# Patient Record
Sex: FEMALE | Race: WHITE | NOT HISPANIC OR LATINO
[De-identification: names, ages, dates, MRNs, and addresses within clinical notes are randomized per-mention and may not be internally consistent; named-entity substitution may affect disease eponyms.]

---

## 2017-01-09 ENCOUNTER — LABORATORY RESULT (OUTPATIENT)
Age: 72
End: 2017-01-09

## 2017-01-09 ENCOUNTER — APPOINTMENT (OUTPATIENT)
Dept: INTERNAL MEDICINE | Facility: CLINIC | Age: 72
End: 2017-01-09

## 2017-01-09 VITALS
WEIGHT: 151 LBS | TEMPERATURE: 97.7 F | HEIGHT: 61 IN | DIASTOLIC BLOOD PRESSURE: 74 MMHG | SYSTOLIC BLOOD PRESSURE: 124 MMHG | BODY MASS INDEX: 28.51 KG/M2 | HEART RATE: 80 BPM | OXYGEN SATURATION: 97 %

## 2017-01-09 LAB
BILIRUB UR QL STRIP: NORMAL
CLARITY UR: CLEAR
COLLECTION METHOD: NORMAL
GLUCOSE UR-MCNC: NORMAL
HCG UR QL: 0.2 EU/DL
HGB UR QL STRIP.AUTO: NORMAL
KETONES UR-MCNC: NORMAL
LEUKOCYTE ESTERASE UR QL STRIP: NORMAL
NITRITE UR QL STRIP: NORMAL
PH UR STRIP: 5.5
PROT UR STRIP-MCNC: NORMAL
SP GR UR STRIP: 1.02

## 2017-01-10 LAB
25(OH)D3 SERPL-MCNC: 36.8 NG/ML
ALBUMIN SERPL ELPH-MCNC: 4 G/DL
ALP BLD-CCNC: 34 U/L
ALT SERPL-CCNC: 12 U/L
ANION GAP SERPL CALC-SCNC: 12 MMOL/L
AST SERPL-CCNC: 20 U/L
BASOPHILS # BLD AUTO: 0.02 K/UL
BASOPHILS NFR BLD AUTO: 0.3 %
BILIRUB SERPL-MCNC: 0.4 MG/DL
BUN SERPL-MCNC: 21 MG/DL
CALCIUM SERPL-MCNC: 9.3 MG/DL
CHLORIDE SERPL-SCNC: 105 MMOL/L
CHOLEST SERPL-MCNC: 208 MG/DL
CHOLEST/HDLC SERPL: 2.7 RATIO
CO2 SERPL-SCNC: 25 MMOL/L
CREAT SERPL-MCNC: 0.81 MG/DL
EOSINOPHIL # BLD AUTO: 0.15 K/UL
EOSINOPHIL NFR BLD AUTO: 2.4 %
GLUCOSE SERPL-MCNC: 93 MG/DL
HBA1C MFR BLD HPLC: 5.8 %
HCT VFR BLD CALC: 43.4 %
HDLC SERPL-MCNC: 78 MG/DL
HGB BLD-MCNC: 13.8 G/DL
IMM GRANULOCYTES NFR BLD AUTO: 0.2 %
LDLC SERPL CALC-MCNC: 119 MG/DL
LYMPHOCYTES # BLD AUTO: 2.67 K/UL
LYMPHOCYTES NFR BLD AUTO: 42.2 %
MAN DIFF?: NORMAL
MCHC RBC-ENTMCNC: 28.5 PG
MCHC RBC-ENTMCNC: 31.8 GM/DL
MCV RBC AUTO: 89.5 FL
MONOCYTES # BLD AUTO: 0.34 K/UL
MONOCYTES NFR BLD AUTO: 5.4 %
NEUTROPHILS # BLD AUTO: 3.14 K/UL
NEUTROPHILS NFR BLD AUTO: 49.5 %
PLATELET # BLD AUTO: 276 K/UL
POTASSIUM SERPL-SCNC: 4.5 MMOL/L
PROT SERPL-MCNC: 6.4 G/DL
RBC # BLD: 4.85 M/UL
RBC # FLD: 14.8 %
SODIUM SERPL-SCNC: 142 MMOL/L
T3RU NFR SERPL: 1.02 INDEX
T4 SERPL-MCNC: 5.8 UG/DL
TRIGL SERPL-MCNC: 57 MG/DL
TSH SERPL-ACNC: 1.64 UU/ML
WBC # FLD AUTO: 6.33 K/UL

## 2017-04-27 ENCOUNTER — OUTPATIENT (OUTPATIENT)
Dept: OUTPATIENT SERVICES | Facility: HOSPITAL | Age: 72
LOS: 1 days | End: 2017-04-27

## 2017-04-27 ENCOUNTER — OUTPATIENT (OUTPATIENT)
Dept: OUTPATIENT SERVICES | Facility: HOSPITAL | Age: 72
LOS: 1 days | End: 2017-04-27
Payer: COMMERCIAL

## 2017-04-27 ENCOUNTER — APPOINTMENT (OUTPATIENT)
Dept: RADIOLOGY | Facility: CLINIC | Age: 72
End: 2017-04-27

## 2017-04-27 DIAGNOSIS — M17.9 OSTEOARTHRITIS OF KNEE, UNSPECIFIED: ICD-10-CM

## 2017-04-27 DIAGNOSIS — M25.562 PAIN IN LEFT KNEE: ICD-10-CM

## 2017-04-27 DIAGNOSIS — M25.561 PAIN IN RIGHT KNEE: ICD-10-CM

## 2017-04-27 PROCEDURE — 73562 X-RAY EXAM OF KNEE 3: CPT

## 2017-05-08 ENCOUNTER — APPOINTMENT (OUTPATIENT)
Dept: INTERNAL MEDICINE | Facility: CLINIC | Age: 72
End: 2017-05-08

## 2017-05-08 ENCOUNTER — NON-APPOINTMENT (OUTPATIENT)
Age: 72
End: 2017-05-08

## 2017-05-08 VITALS
SYSTOLIC BLOOD PRESSURE: 120 MMHG | WEIGHT: 159 LBS | TEMPERATURE: 98.1 F | OXYGEN SATURATION: 93 % | HEART RATE: 76 BPM | HEIGHT: 61 IN | BODY MASS INDEX: 30.02 KG/M2 | DIASTOLIC BLOOD PRESSURE: 76 MMHG

## 2017-05-08 DIAGNOSIS — Z78.9 OTHER SPECIFIED HEALTH STATUS: ICD-10-CM

## 2017-05-08 DIAGNOSIS — Z01.818 ENCOUNTER FOR OTHER PREPROCEDURAL EXAMINATION: ICD-10-CM

## 2017-05-08 DIAGNOSIS — R19.4 CHANGE IN BOWEL HABIT: ICD-10-CM

## 2017-05-08 DIAGNOSIS — M72.0 PALMAR FASCIAL FIBROMATOSIS [DUPUYTREN]: ICD-10-CM

## 2017-05-08 DIAGNOSIS — R35.0 FREQUENCY OF MICTURITION: ICD-10-CM

## 2017-05-08 DIAGNOSIS — Z87.898 PERSONAL HISTORY OF OTHER SPECIFIED CONDITIONS: ICD-10-CM

## 2017-05-09 LAB
APPEARANCE: CLEAR
BACTERIA: ABNORMAL
BILIRUBIN URINE: NEGATIVE
BLOOD URINE: ABNORMAL
COLOR: YELLOW
GLUCOSE QUALITATIVE U: NORMAL MG/DL
HYALINE CASTS: 6 /LPF
KETONES URINE: NEGATIVE
LEUKOCYTE ESTERASE URINE: NEGATIVE
MICROSCOPIC-UA: NORMAL
NITRITE URINE: NEGATIVE
PH URINE: 6
PROTEIN URINE: NEGATIVE MG/DL
RED BLOOD CELLS URINE: 2 /HPF
SPECIFIC GRAVITY URINE: 1.01
SQUAMOUS EPITHELIAL CELLS: 2 /HPF
UROBILINOGEN URINE: NORMAL MG/DL
WHITE BLOOD CELLS URINE: 0 /HPF

## 2017-05-11 LAB — BACTERIA UR CULT: ABNORMAL

## 2017-10-12 ENCOUNTER — APPOINTMENT (OUTPATIENT)
Dept: MRI IMAGING | Facility: CLINIC | Age: 72
End: 2017-10-12
Payer: COMMERCIAL

## 2017-10-12 ENCOUNTER — OUTPATIENT (OUTPATIENT)
Dept: OUTPATIENT SERVICES | Facility: HOSPITAL | Age: 72
LOS: 1 days | End: 2017-10-12
Payer: COMMERCIAL

## 2017-10-12 DIAGNOSIS — Z00.8 ENCOUNTER FOR OTHER GENERAL EXAMINATION: ICD-10-CM

## 2017-10-12 PROCEDURE — 73721 MRI JNT OF LWR EXTRE W/O DYE: CPT | Mod: 26,RT

## 2017-10-12 PROCEDURE — 73721 MRI JNT OF LWR EXTRE W/O DYE: CPT

## 2017-12-13 ENCOUNTER — APPOINTMENT (OUTPATIENT)
Dept: ORTHOPEDIC SURGERY | Facility: CLINIC | Age: 72
End: 2017-12-13
Payer: COMMERCIAL

## 2017-12-13 PROCEDURE — 99203 OFFICE O/P NEW LOW 30 MIN: CPT

## 2018-01-21 ENCOUNTER — LABORATORY RESULT (OUTPATIENT)
Age: 73
End: 2018-01-21

## 2018-01-22 ENCOUNTER — APPOINTMENT (OUTPATIENT)
Dept: INTERNAL MEDICINE | Facility: CLINIC | Age: 73
End: 2018-01-22
Payer: COMMERCIAL

## 2018-01-22 ENCOUNTER — NON-APPOINTMENT (OUTPATIENT)
Age: 73
End: 2018-01-22

## 2018-01-22 VITALS
BODY MASS INDEX: 30.21 KG/M2 | DIASTOLIC BLOOD PRESSURE: 70 MMHG | SYSTOLIC BLOOD PRESSURE: 110 MMHG | OXYGEN SATURATION: 98 % | HEART RATE: 77 BPM | HEIGHT: 61 IN | TEMPERATURE: 97.8 F | WEIGHT: 160 LBS

## 2018-01-22 DIAGNOSIS — M54.16 RADICULOPATHY, LUMBAR REGION: ICD-10-CM

## 2018-01-22 DIAGNOSIS — Z00.00 ENCOUNTER FOR GENERAL ADULT MEDICAL EXAMINATION W/OUT ABNORMAL FINDINGS: ICD-10-CM

## 2018-01-22 DIAGNOSIS — H35.30 UNSPECIFIED MACULAR DEGENERATION: ICD-10-CM

## 2018-01-22 DIAGNOSIS — E55.9 VITAMIN D DEFICIENCY, UNSPECIFIED: ICD-10-CM

## 2018-01-22 PROCEDURE — 36415 COLL VENOUS BLD VENIPUNCTURE: CPT

## 2018-01-22 PROCEDURE — 93000 ELECTROCARDIOGRAM COMPLETE: CPT

## 2018-01-22 PROCEDURE — 99397 PER PM REEVAL EST PAT 65+ YR: CPT | Mod: 25

## 2018-01-23 LAB
25(OH)D3 SERPL-MCNC: 22.7 NG/ML
ALBUMIN SERPL ELPH-MCNC: 3.9 G/DL
ALP BLD-CCNC: 39 U/L
ALT SERPL-CCNC: 7 U/L
ANION GAP SERPL CALC-SCNC: 15 MMOL/L
AST SERPL-CCNC: 16 U/L
BASOPHILS # BLD AUTO: 0.02 K/UL
BASOPHILS NFR BLD AUTO: 0.3 %
BILIRUB SERPL-MCNC: 0.5 MG/DL
BUN SERPL-MCNC: 27 MG/DL
CALCIUM SERPL-MCNC: 9.2 MG/DL
CHLORIDE SERPL-SCNC: 105 MMOL/L
CHOLEST SERPL-MCNC: 201 MG/DL
CHOLEST/HDLC SERPL: 2.7 RATIO
CO2 SERPL-SCNC: 23 MMOL/L
CREAT SERPL-MCNC: 0.85 MG/DL
EOSINOPHIL # BLD AUTO: 0.15 K/UL
EOSINOPHIL NFR BLD AUTO: 2.4 %
GLUCOSE SERPL-MCNC: 94 MG/DL
HBA1C MFR BLD HPLC: 5.6 %
HCT VFR BLD CALC: 43.2 %
HDLC SERPL-MCNC: 75 MG/DL
HGB BLD-MCNC: 13.4 G/DL
IMM GRANULOCYTES NFR BLD AUTO: 0.2 %
LDLC SERPL CALC-MCNC: 115 MG/DL
LYMPHOCYTES # BLD AUTO: 1.86 K/UL
LYMPHOCYTES NFR BLD AUTO: 30.3 %
MAN DIFF?: NORMAL
MCHC RBC-ENTMCNC: 28.6 PG
MCHC RBC-ENTMCNC: 31 GM/DL
MCV RBC AUTO: 92.3 FL
MONOCYTES # BLD AUTO: 0.45 K/UL
MONOCYTES NFR BLD AUTO: 7.3 %
NEUTROPHILS # BLD AUTO: 3.64 K/UL
NEUTROPHILS NFR BLD AUTO: 59.5 %
PLATELET # BLD AUTO: 224 K/UL
POTASSIUM SERPL-SCNC: 4.1 MMOL/L
PROT SERPL-MCNC: 6.3 G/DL
RBC # BLD: 4.68 M/UL
RBC # FLD: 14.8 %
SODIUM SERPL-SCNC: 143 MMOL/L
T3RU NFR SERPL: 0.93 INDEX
T4 SERPL-MCNC: 6 UG/DL
TRIGL SERPL-MCNC: 54 MG/DL
TSH SERPL-ACNC: 1.26 UIU/ML
WBC # FLD AUTO: 6.13 K/UL

## 2018-06-27 ENCOUNTER — RX RENEWAL (OUTPATIENT)
Age: 73
End: 2018-06-27

## 2018-08-19 ENCOUNTER — RX RENEWAL (OUTPATIENT)
Age: 73
End: 2018-08-19

## 2018-10-10 ENCOUNTER — APPOINTMENT (OUTPATIENT)
Dept: PULMONOLOGY | Facility: CLINIC | Age: 73
End: 2018-10-10
Payer: COMMERCIAL

## 2018-10-10 VITALS
RESPIRATION RATE: 12 BRPM | HEART RATE: 66 BPM | DIASTOLIC BLOOD PRESSURE: 75 MMHG | OXYGEN SATURATION: 94 % | TEMPERATURE: 97.8 F | SYSTOLIC BLOOD PRESSURE: 131 MMHG

## 2018-10-10 PROCEDURE — 94060 EVALUATION OF WHEEZING: CPT

## 2018-10-10 PROCEDURE — 99213 OFFICE O/P EST LOW 20 MIN: CPT | Mod: 25

## 2018-10-10 PROCEDURE — G0008: CPT

## 2018-10-10 PROCEDURE — 90662 IIV NO PRSV INCREASED AG IM: CPT

## 2018-10-31 ENCOUNTER — APPOINTMENT (OUTPATIENT)
Dept: PULMONOLOGY | Facility: CLINIC | Age: 73
End: 2018-10-31
Payer: COMMERCIAL

## 2018-10-31 VITALS — SYSTOLIC BLOOD PRESSURE: 126 MMHG | DIASTOLIC BLOOD PRESSURE: 80 MMHG

## 2018-10-31 VITALS
TEMPERATURE: 97.9 F | WEIGHT: 160 LBS | HEIGHT: 61 IN | DIASTOLIC BLOOD PRESSURE: 85 MMHG | OXYGEN SATURATION: 93 % | BODY MASS INDEX: 30.21 KG/M2 | SYSTOLIC BLOOD PRESSURE: 150 MMHG | RESPIRATION RATE: 14 BRPM | HEART RATE: 61 BPM

## 2018-10-31 PROCEDURE — 99213 OFFICE O/P EST LOW 20 MIN: CPT

## 2018-11-12 ENCOUNTER — MEDICATION RENEWAL (OUTPATIENT)
Age: 73
End: 2018-11-12

## 2018-11-13 ENCOUNTER — RX RENEWAL (OUTPATIENT)
Age: 73
End: 2018-11-13

## 2018-11-19 ENCOUNTER — MEDICATION RENEWAL (OUTPATIENT)
Age: 73
End: 2018-11-19

## 2019-01-02 ENCOUNTER — MEDICATION RENEWAL (OUTPATIENT)
Age: 74
End: 2019-01-02

## 2019-01-22 ENCOUNTER — RX RENEWAL (OUTPATIENT)
Age: 74
End: 2019-01-22

## 2019-02-05 ENCOUNTER — APPOINTMENT (OUTPATIENT)
Dept: PULMONOLOGY | Facility: CLINIC | Age: 74
End: 2019-02-05
Payer: COMMERCIAL

## 2019-02-05 VITALS
HEART RATE: 79 BPM | OXYGEN SATURATION: 95 % | RESPIRATION RATE: 16 BRPM | DIASTOLIC BLOOD PRESSURE: 69 MMHG | SYSTOLIC BLOOD PRESSURE: 113 MMHG

## 2019-02-05 DIAGNOSIS — J98.59 OTHER DISEASES OF MEDIASTINUM, NOT ELSEWHERE CLASSIFIED: ICD-10-CM

## 2019-02-05 PROCEDURE — 99214 OFFICE O/P EST MOD 30 MIN: CPT

## 2019-02-05 NOTE — PHYSICAL EXAM
[General Appearance - Well Developed] : well developed [General Appearance - Well Nourished] : well nourished [Normal Oropharynx] : normal oropharynx [Jugular Venous Distention Increased] : there was no jugular-venous distention [Heart Sounds] : normal S1 and S2 [Murmurs] : no murmurs present [Respiration, Rhythm And Depth] : normal respiratory rhythm and effort [Exaggerated Use Of Accessory Muscles For Inspiration] : no accessory muscle use [Auscultation Breath Sounds / Voice Sounds] : lungs were clear to auscultation bilaterally [Abdomen Soft] : soft [Abdomen Tenderness] : non-tender [Abdomen Mass (___ Cm)] : no abdominal mass palpated [Nail Clubbing] : no clubbing of the fingernails [Cyanosis, Localized] : no localized cyanosis [Petechial Hemorrhages (___cm)] : no petechial hemorrhages [] : no ischemic changes

## 2019-02-07 NOTE — HISTORY OF PRESENT ILLNESS
[FreeTextEntry1] : Took arnuity for 3 months with positive response. Then ran out and cough recc. despite proair

## 2019-02-07 NOTE — ASSESSMENT
[FreeTextEntry1] : Cough improved with inhaled steroid recurred off. ? cough variant asthma\par Restart inhaled steroid\par \par Abnormal ct likely inf/inflamm\par Thymic abn no change. Arrange pre and post contrast ct in 6 months

## 2019-03-08 ENCOUNTER — MEDICATION RENEWAL (OUTPATIENT)
Age: 74
End: 2019-03-08

## 2019-03-08 ENCOUNTER — TRANSCRIPTION ENCOUNTER (OUTPATIENT)
Age: 74
End: 2019-03-08

## 2019-03-12 ENCOUNTER — RX RENEWAL (OUTPATIENT)
Age: 74
End: 2019-03-12

## 2019-04-09 ENCOUNTER — APPOINTMENT (OUTPATIENT)
Dept: PULMONOLOGY | Facility: CLINIC | Age: 74
End: 2019-04-09
Payer: COMMERCIAL

## 2019-04-09 VITALS
TEMPERATURE: 97.6 F | DIASTOLIC BLOOD PRESSURE: 76 MMHG | SYSTOLIC BLOOD PRESSURE: 124 MMHG | RESPIRATION RATE: 12 BRPM | HEART RATE: 69 BPM | OXYGEN SATURATION: 96 %

## 2019-04-09 PROCEDURE — 99213 OFFICE O/P EST LOW 20 MIN: CPT

## 2019-04-09 NOTE — PHYSICAL EXAM
[General Appearance - Well Developed] : well developed [Normal Oropharynx] : normal oropharynx [General Appearance - Well Nourished] : well nourished [Jugular Venous Distention Increased] : there was no jugular-venous distention [Heart Sounds] : normal S1 and S2 [Murmurs] : no murmurs present [Respiration, Rhythm And Depth] : normal respiratory rhythm and effort [Auscultation Breath Sounds / Voice Sounds] : lungs were clear to auscultation bilaterally [Exaggerated Use Of Accessory Muscles For Inspiration] : no accessory muscle use [Abdomen Soft] : soft [Abdomen Tenderness] : non-tender [Abdomen Mass (___ Cm)] : no abdominal mass palpated [Cyanosis, Localized] : no localized cyanosis [Petechial Hemorrhages (___cm)] : no petechial hemorrhages [Nail Clubbing] : no clubbing of the fingernails [] : no ischemic changes

## 2019-04-11 NOTE — HISTORY OF PRESENT ILLNESS
[FreeTextEntry1] : Cough is much improved Asmanex 2 puffs daily. On Xyzal for allergies. Minimal mucus

## 2019-04-11 NOTE — ASSESSMENT
[FreeTextEntry1] : Patient clinically improved.\par \par \par Medications reviewed and renewed.\par

## 2019-05-09 ENCOUNTER — APPOINTMENT (OUTPATIENT)
Dept: PULMONOLOGY | Facility: CLINIC | Age: 74
End: 2019-05-09
Payer: COMMERCIAL

## 2019-05-09 VITALS
RESPIRATION RATE: 12 BRPM | SYSTOLIC BLOOD PRESSURE: 127 MMHG | OXYGEN SATURATION: 97 % | DIASTOLIC BLOOD PRESSURE: 77 MMHG | HEART RATE: 79 BPM | TEMPERATURE: 98.3 F

## 2019-05-09 DIAGNOSIS — J06.9 ACUTE UPPER RESPIRATORY INFECTION, UNSPECIFIED: ICD-10-CM

## 2019-05-09 PROCEDURE — 87804 INFLUENZA ASSAY W/OPTIC: CPT | Mod: QW

## 2019-05-09 PROCEDURE — 87880 STREP A ASSAY W/OPTIC: CPT | Mod: QW

## 2019-05-09 PROCEDURE — 99214 OFFICE O/P EST MOD 30 MIN: CPT | Mod: 25

## 2019-05-09 PROCEDURE — 71046 X-RAY EXAM CHEST 2 VIEWS: CPT

## 2019-05-09 RX ORDER — MONTELUKAST 10 MG/1
10 TABLET, FILM COATED ORAL
Qty: 30 | Refills: 5 | Status: DISCONTINUED | COMMUNITY
Start: 2018-10-10 | End: 2019-05-09

## 2019-05-09 NOTE — REVIEW OF SYSTEMS
[Postnasal Drip] : postnasal drip [Nasal Congestion] : nasal congestion [Sore Throat] : sore throat [Cough] : cough [Negative] : Sleep Disorder

## 2019-05-10 LAB
HPIV3 RNA SPEC QL NAA+PROBE: DETECTED
RAPID RVP RESULT: DETECTED

## 2019-05-10 NOTE — REASON FOR VISIT
[Follow-Up] : a follow-up visit [Cough] : cough [Shortness of Breath] : shortness of Breath [FreeTextEntry2] : Sick with cough for the last 3 days, also has nasal congestion and sore throat

## 2019-05-10 NOTE — PHYSICAL EXAM
[Normal Appearance] : normal appearance [General Appearance - Well Developed] : well developed [Well Groomed] : well groomed [General Appearance - In No Acute Distress] : no acute distress [No Deformities] : no deformities [General Appearance - Well Nourished] : well nourished [Erythema] : erythema of the pharynx [Heart Sounds] : normal S1 and S2 [Heart Rate And Rhythm] : heart rate and rhythm were normal [Murmurs] : no murmurs present [Respiration, Rhythm And Depth] : normal respiratory rhythm and effort [Auscultation Breath Sounds / Voice Sounds] : lungs were clear to auscultation bilaterally [Exaggerated Use Of Accessory Muscles For Inspiration] : no accessory muscle use [Abdomen Soft] : soft [Abdomen Tenderness] : non-tender [Cyanosis, Localized] : no localized cyanosis [Abdomen Mass (___ Cm)] : no abdominal mass palpated [Nail Clubbing] : no clubbing of the fingernails [Petechial Hemorrhages (___cm)] : no petechial hemorrhages [] : no ischemic changes

## 2019-05-10 NOTE — PROCEDURE
[FreeTextEntry1] : Rapid flu and strep tests are negative\par \par Chest x-ray PA and lateral views performed in my office today showed clear lungs, no evidence of infiltrates or pleural effusions.\par

## 2019-05-10 NOTE — ASSESSMENT
[FreeTextEntry1] : Collected nasopharyngeal RVP for further evaluation. Starting Peggy on Z-Carlton and Medrol Dosepak. Also advised her on well hydration

## 2019-05-10 NOTE — HISTORY OF PRESENT ILLNESS
[FreeTextEntry1] : Started feeling itchy throat over the weekend that progressed and worsened yesterday. Cough, nasal congestion, runny nose, head cold and earache. Has been taking Asmanex to help sleep through the night. Used proair inhaler. No complaints of SOB, or dyspnea.

## 2019-05-12 LAB
FLUAV SPEC QL CULT: NORMAL
FLUBV AG SPEC QL IA: NORMAL
S PYO AG SPEC QL IA: NORMAL

## 2019-07-02 DIAGNOSIS — E32.0 PERSISTENT HYPERPLASIA OF THYMUS: ICD-10-CM

## 2019-07-19 ENCOUNTER — TRANSCRIPTION ENCOUNTER (OUTPATIENT)
Age: 74
End: 2019-07-19

## 2019-09-03 ENCOUNTER — TRANSCRIPTION ENCOUNTER (OUTPATIENT)
Age: 74
End: 2019-09-03

## 2019-11-04 ENCOUNTER — FORM ENCOUNTER (OUTPATIENT)
Age: 74
End: 2019-11-04

## 2019-11-05 ENCOUNTER — APPOINTMENT (OUTPATIENT)
Dept: PULMONOLOGY | Facility: CLINIC | Age: 74
End: 2019-11-05
Payer: MEDICARE

## 2019-11-05 VITALS
OXYGEN SATURATION: 96 % | SYSTOLIC BLOOD PRESSURE: 108 MMHG | BODY MASS INDEX: 30.21 KG/M2 | HEART RATE: 71 BPM | RESPIRATION RATE: 12 BRPM | DIASTOLIC BLOOD PRESSURE: 74 MMHG | WEIGHT: 160 LBS | HEIGHT: 61 IN | TEMPERATURE: 97.4 F

## 2019-11-05 PROCEDURE — 94729 DIFFUSING CAPACITY: CPT

## 2019-11-05 PROCEDURE — 94727 GAS DIL/WSHOT DETER LNG VOL: CPT

## 2019-11-05 PROCEDURE — 99213 OFFICE O/P EST LOW 20 MIN: CPT | Mod: 25

## 2019-11-05 PROCEDURE — 94060 EVALUATION OF WHEEZING: CPT

## 2019-11-05 RX ORDER — METHYLPREDNISOLONE 4 MG/1
4 TABLET ORAL
Qty: 1 | Refills: 0 | Status: DISCONTINUED | COMMUNITY
Start: 2019-05-09 | End: 2019-11-05

## 2019-11-05 RX ORDER — AZITHROMYCIN 250 MG/1
250 TABLET, FILM COATED ORAL
Qty: 6 | Refills: 1 | Status: DISCONTINUED | COMMUNITY
Start: 2019-05-09 | End: 2019-11-05

## 2019-11-05 RX ORDER — MOMETASONE FUROATE 220 UG/1
220 INHALANT RESPIRATORY (INHALATION) DAILY
Qty: 1 | Refills: 5 | Status: DISCONTINUED | COMMUNITY
Start: 2019-02-05 | End: 2019-11-05

## 2019-11-05 NOTE — DISCUSSION/SUMMARY
[FreeTextEntry1] : Bronchiectasis stable\par Cough improved on inhaled steroid. ? cough variant asthma.

## 2019-11-05 NOTE — HISTORY OF PRESENT ILLNESS
[FreeTextEntry1] : Doing well except when allergies flare. Then some cough.\par \par Had Flu and pneumonia shots.

## 2019-11-05 NOTE — PROCEDURE
[FreeTextEntry1] : 11/05/2019\par Pulmonary function testing\par FEV1, FVC, and FEV1/FVC are within normal limits. There was not a significant response to inhaled bronchodilator. TLC and subdivisions are normal. RV/TLC ratio is normal. Single breath diffusion capacity is normal.

## 2019-12-02 ENCOUNTER — RX RENEWAL (OUTPATIENT)
Age: 74
End: 2019-12-02

## 2020-02-03 ENCOUNTER — RX RENEWAL (OUTPATIENT)
Age: 75
End: 2020-02-03

## 2020-04-24 ENCOUNTER — RX RENEWAL (OUTPATIENT)
Age: 75
End: 2020-04-24

## 2020-04-26 ENCOUNTER — TRANSCRIPTION ENCOUNTER (OUTPATIENT)
Age: 75
End: 2020-04-26

## 2020-05-10 ENCOUNTER — TRANSCRIPTION ENCOUNTER (OUTPATIENT)
Age: 75
End: 2020-05-10

## 2020-06-26 ENCOUNTER — TRANSCRIPTION ENCOUNTER (OUTPATIENT)
Age: 75
End: 2020-06-26

## 2020-07-08 ENCOUNTER — APPOINTMENT (OUTPATIENT)
Dept: PULMONOLOGY | Facility: CLINIC | Age: 75
End: 2020-07-08

## 2020-07-17 ENCOUNTER — APPOINTMENT (OUTPATIENT)
Dept: PULMONOLOGY | Facility: CLINIC | Age: 75
End: 2020-07-17
Payer: MEDICARE

## 2020-07-17 PROCEDURE — 99213 OFFICE O/P EST LOW 20 MIN: CPT | Mod: 95

## 2020-07-19 NOTE — HISTORY OF PRESENT ILLNESS
[Home] : at home, [unfilled] , at the time of the visit. [Medical Office: ([facility])___] : at the medical office located in  [TextBox_4] : This visit was provided via telehealth using real-time 2-way audio visual technology. The patient, CYNTHIA ANGELES , was located at home, [facility]\[place] [address]  at the time of the visit.\par The provider, Niels Pastor, was located at office [address] [place] [address] at the time of the visit. \par \par  The patient, Ms. CYNTHIA ANGELES  and Physician Niels Vargas participated in the telehealth encounter.\par \par Verbal consent obtained by  from patient\par Bad allergy season\par Using Asmanex in PM.  Some need for beta agonist therapy.  Associated with allergy season.  Denies chest pain or wheezing.  Mild cough.\par Feeling generally well.

## 2020-07-19 NOTE — DISCUSSION/SUMMARY
[FreeTextEntry1] : Asthma with mild increase in symptoms.\par Pulmonary nodules with one new nodule.  Likely infectious inflammatory in origin.

## 2020-10-14 ENCOUNTER — APPOINTMENT (OUTPATIENT)
Dept: MAMMOGRAPHY | Facility: CLINIC | Age: 75
End: 2020-10-14
Payer: MEDICARE

## 2020-10-14 ENCOUNTER — APPOINTMENT (OUTPATIENT)
Dept: ULTRASOUND IMAGING | Facility: CLINIC | Age: 75
End: 2020-10-14
Payer: MEDICARE

## 2020-10-14 PROCEDURE — 77067 SCR MAMMO BI INCL CAD: CPT

## 2020-10-14 PROCEDURE — 76641 ULTRASOUND BREAST COMPLETE: CPT | Mod: 50

## 2020-10-14 PROCEDURE — 77063 BREAST TOMOSYNTHESIS BI: CPT

## 2020-10-19 ENCOUNTER — APPOINTMENT (OUTPATIENT)
Dept: ULTRASOUND IMAGING | Facility: CLINIC | Age: 75
End: 2020-10-19
Payer: MEDICARE

## 2020-10-19 PROCEDURE — 76642 ULTRASOUND BREAST LIMITED: CPT | Mod: LT

## 2020-10-26 ENCOUNTER — APPOINTMENT (OUTPATIENT)
Dept: ULTRASOUND IMAGING | Facility: CLINIC | Age: 75
End: 2020-10-26
Payer: MEDICARE

## 2020-10-26 ENCOUNTER — RESULT REVIEW (OUTPATIENT)
Age: 75
End: 2020-10-26

## 2020-10-26 PROCEDURE — 77065 DX MAMMO INCL CAD UNI: CPT | Mod: LT

## 2020-10-26 PROCEDURE — 19083 BX BREAST 1ST LESION US IMAG: CPT | Mod: LT

## 2020-11-22 ENCOUNTER — RX RENEWAL (OUTPATIENT)
Age: 75
End: 2020-11-22

## 2021-02-14 ENCOUNTER — RX RENEWAL (OUTPATIENT)
Age: 76
End: 2021-02-14

## 2021-05-04 ENCOUNTER — RESULT REVIEW (OUTPATIENT)
Age: 76
End: 2021-05-04

## 2021-05-04 ENCOUNTER — APPOINTMENT (OUTPATIENT)
Dept: CT IMAGING | Facility: CLINIC | Age: 76
End: 2021-05-04
Payer: MEDICARE

## 2021-05-04 PROCEDURE — 71250 CT THORAX DX C-: CPT | Mod: MH

## 2021-05-11 ENCOUNTER — OUTPATIENT (OUTPATIENT)
Dept: OUTPATIENT SERVICES | Facility: HOSPITAL | Age: 76
LOS: 1 days | End: 2021-05-11

## 2021-05-14 ENCOUNTER — APPOINTMENT (OUTPATIENT)
Dept: PULMONOLOGY | Facility: CLINIC | Age: 76
End: 2021-05-14
Payer: MEDICARE

## 2021-05-14 VITALS
HEART RATE: 77 BPM | TEMPERATURE: 98 F | OXYGEN SATURATION: 96 % | WEIGHT: 160 LBS | DIASTOLIC BLOOD PRESSURE: 68 MMHG | SYSTOLIC BLOOD PRESSURE: 106 MMHG | HEIGHT: 61 IN | BODY MASS INDEX: 30.21 KG/M2

## 2021-05-14 DIAGNOSIS — J30.9 ALLERGIC RHINITIS, UNSPECIFIED: ICD-10-CM

## 2021-05-14 DIAGNOSIS — R91.1 SOLITARY PULMONARY NODULE: ICD-10-CM

## 2021-05-14 PROCEDURE — 99214 OFFICE O/P EST MOD 30 MIN: CPT

## 2021-05-14 NOTE — PHYSICAL EXAM
[No Acute Distress] : no acute distress [Supple] : supple [No JVD] : no jvd [Normal S1, S2] : normal s1, s2 [No Murmurs] : no murmurs [Clear to Auscultation Bilaterally] : clear to auscultation bilaterally [Normal to Percussion] : normal to percussion [No Abnormalities] : no abnormalities [Benign] : benign [Not Tender] : not tender [No HSM] : no hsm [No Clubbing] : no clubbing [No Cyanosis] : no cyanosis [No Edema] : no edema

## 2021-05-14 NOTE — HISTORY OF PRESENT ILLNESS
[TextBox_4] : Persistent cough\par Difficulty with allergies.\par Occasional mucous\par Attributes cough predominantly to allergies however states was improved when on inhaled steroid therapy.\par Denies wheezing or shortness of breath.\par Denies chest discomfort.\par Otherwise well.\par \par \par

## 2021-05-14 NOTE — ASSESSMENT
[FreeTextEntry1] : Trial of adding Singulair.  Discussed black box warning.\par Restart Asmanex 2 puffs twice daily.\par Continue antihistamine.\par We will attempt to obtain prior CAT scan and compared to current scan.\par Further recommendations after review of above regarding follow-up CT.

## 2021-05-14 NOTE — DISCUSSION/SUMMARY
[FreeTextEntry1] : Cough.  Component related to allergies allergic rhinitis and postnasal drip.  Probable component related to airways disease.\par Asthma with mild increase in symptoms.\par Pulmonary nodules likely stable.  Patient went to different facility for post recent CT.

## 2021-05-19 ENCOUNTER — APPOINTMENT (OUTPATIENT)
Dept: PULMONOLOGY | Facility: CLINIC | Age: 76
End: 2021-05-19

## 2021-05-27 ENCOUNTER — RX RENEWAL (OUTPATIENT)
Age: 76
End: 2021-05-27

## 2021-06-24 PROBLEM — Z00.00 ENCOUNTER FOR PREVENTIVE HEALTH EXAMINATION: Noted: 2021-06-24

## 2021-07-08 ENCOUNTER — OUTPATIENT (OUTPATIENT)
Dept: OUTPATIENT SERVICES | Facility: HOSPITAL | Age: 76
LOS: 1 days | End: 2021-07-08

## 2021-08-06 ENCOUNTER — APPOINTMENT (OUTPATIENT)
Dept: OPHTHALMOLOGY | Facility: CLINIC | Age: 76
End: 2021-08-06

## 2021-09-29 ENCOUNTER — APPOINTMENT (OUTPATIENT)
Dept: PULMONOLOGY | Facility: CLINIC | Age: 76
End: 2021-09-29
Payer: MEDICARE

## 2021-09-29 VITALS
TEMPERATURE: 97.9 F | SYSTOLIC BLOOD PRESSURE: 124 MMHG | HEART RATE: 74 BPM | DIASTOLIC BLOOD PRESSURE: 84 MMHG | HEIGHT: 61 IN | OXYGEN SATURATION: 96 %

## 2021-09-29 DIAGNOSIS — R05 COUGH: ICD-10-CM

## 2021-09-29 PROCEDURE — 71046 X-RAY EXAM CHEST 2 VIEWS: CPT

## 2021-09-29 PROCEDURE — 99214 OFFICE O/P EST MOD 30 MIN: CPT | Mod: 25

## 2021-09-30 NOTE — DISCUSSION/SUMMARY
[FreeTextEntry1] : Most likely related to postinfectious inflammatory bronchitis or mild exacerbation of bronchiectasis.\par Has improved over the past few days.

## 2021-09-30 NOTE — HISTORY OF PRESENT ILLNESS
[TextBox_4] : went to walk in after start of nonproductive strong cough, hurt ribs the cough was bad, needs codeine at night to sleep\par yesterday was her best day but today while playing tennis felt wheeze and does notice whistle at night\par started Sept 19 th. Did have URI from 9/6 then improved. \par was on Flovent prior\par COVID negative\par had a URI form granddaughter\par had 3rd vaccine Sept 9 th Pfizer's\par \par Cough now somewhat improved. Dry cough. Feels whistle in chest lying down.\par No fever.\par Not ill. \par \par Has been taking Flovent, Singulair and Xyzal and had a good summer. \par \par Mild upper airway\par Minimal GERD\par \par Brother with mesothelioma without clear exposure

## 2021-09-30 NOTE — PHYSICAL EXAM
[No Acute Distress] : no acute distress [Normal S1, S2] : normal s1, s2 [No Murmurs] : no murmurs [Benign] : benign [No HSM] : no hsm [No Clubbing] : no clubbing [No Cyanosis] : no cyanosis [No Edema] : no edema [Oriented x3] : oriented x3 [TextBox_68] : Rare crackles right base.

## 2021-09-30 NOTE — ASSESSMENT
[FreeTextEntry1] : Close observation.\par If cough persists consider course of corticosteroid therapy.\par Continue present bronchodilator therapy.\par As needed cough medication.\par \par \par Consider repeat ct 5/22 brother with mesothelioma.

## 2021-10-04 ENCOUNTER — APPOINTMENT (OUTPATIENT)
Dept: MAMMOGRAPHY | Facility: CLINIC | Age: 76
End: 2021-10-04
Payer: MEDICARE

## 2021-10-04 ENCOUNTER — APPOINTMENT (OUTPATIENT)
Dept: ULTRASOUND IMAGING | Facility: CLINIC | Age: 76
End: 2021-10-04
Payer: MEDICARE

## 2021-10-04 PROCEDURE — 77063 BREAST TOMOSYNTHESIS BI: CPT

## 2021-10-04 PROCEDURE — 77067 SCR MAMMO BI INCL CAD: CPT

## 2021-10-04 PROCEDURE — 76641 ULTRASOUND BREAST COMPLETE: CPT | Mod: 50

## 2021-10-05 ENCOUNTER — APPOINTMENT (OUTPATIENT)
Dept: PULMONOLOGY | Facility: CLINIC | Age: 76
End: 2021-10-05

## 2021-10-07 ENCOUNTER — RESULT REVIEW (OUTPATIENT)
Age: 76
End: 2021-10-07

## 2021-10-07 ENCOUNTER — APPOINTMENT (OUTPATIENT)
Dept: ULTRASOUND IMAGING | Facility: CLINIC | Age: 76
End: 2021-10-07
Payer: MEDICARE

## 2021-10-07 ENCOUNTER — APPOINTMENT (OUTPATIENT)
Age: 76
End: 2021-10-07
Payer: MEDICARE

## 2021-10-07 VITALS
SYSTOLIC BLOOD PRESSURE: 145 MMHG | WEIGHT: 158 LBS | DIASTOLIC BLOOD PRESSURE: 85 MMHG | TEMPERATURE: 97.8 F | HEIGHT: 60 IN | HEART RATE: 65 BPM | BODY MASS INDEX: 31.02 KG/M2

## 2021-10-07 DIAGNOSIS — R92.8 OTHER ABNORMAL AND INCONCLUSIVE FINDINGS ON DIAGNOSTIC IMAGING OF BREAST: ICD-10-CM

## 2021-10-07 DIAGNOSIS — Z72.89 OTHER PROBLEMS RELATED TO LIFESTYLE: ICD-10-CM

## 2021-10-07 DIAGNOSIS — R93.89 ABNORMAL FINDINGS ON DIAGNOSTIC IMAGING OF OTHER SPECIFIED BODY STRUCTURES: ICD-10-CM

## 2021-10-07 PROCEDURE — 77065 DX MAMMO INCL CAD UNI: CPT | Mod: LT

## 2021-10-07 PROCEDURE — 19083 BX BREAST 1ST LESION US IMAG: CPT | Mod: LT

## 2021-10-07 PROCEDURE — 99203 OFFICE O/P NEW LOW 30 MIN: CPT

## 2021-10-07 PROCEDURE — A4648: CPT | Mod: NC

## 2021-10-07 RX ORDER — ATORVASTATIN CALCIUM 20 MG/1
20 TABLET, FILM COATED ORAL
Refills: 0 | Status: ACTIVE | COMMUNITY

## 2021-10-07 NOTE — HISTORY OF PRESENT ILLNESS
[FreeTextEntry1] : Pt is a 76 year old white female referred for consultation by Dr. Hilario Freeman for abnormal mmg/us. Pending L U/S bx results done today 10/7/21. PCP Niels Cancino\par Here w/ her  José Antonio.\par Pt denies any breast lesions, discharge or masses.\par Pt is planning on going to [place] next weds, [place] are [employer] in [place]. \par \par 10/4/21, Korin Biswas sMMG: comp to 2018 studies, het dense, stable R UOQ and mid 3:00 L breast surgical scarring, R bx tissue marker, L SA bx tissue marker, *11x6mm lobulated posterior 12N 7-8:00 L breast nodule, developed, no susp findings, Bilat U/S: R no susp solid mass, L [id number] partially circumscribed/angular solid nodule 7:00 12N  tanner to mmg finding, 4x3mm circumscribed 2:00 nodule 2N unchanged, 5x3mm circumscribed hypoechoic nodule or complex cyst 3:00 6N, *rec u/s bx 7:00 12mm nodule, BR4B ADDENDUM: L axillary U/S performed prior to bx 10/7 and no L axillary adenopathy noted.\par \par 10/7/21, L U/S bx 7:00 10N 10mm nodule PATH: pending\par Hx of MARGARET BSO for fibroids and endometriosis. \par \par Personal hx of L RA benign bx 10/2020, and back in 1992 - FA. \par Fhx: Brother w/ Mesothelioma at 87. AJ\par \par

## 2021-10-07 NOTE — DATA REVIEWED
[FreeTextEntry1] : 10/4/21, Korin Biswas sMMG: het dense, stable R UOQ and mid 3:00 L breast surgical scarring, R bx tissue marker, L SA bx tissue marker, 11x6mm lobulated posterior/12N 7-8:00 L breast nodule, developed dating back to 2018, no susp findings, Bilat U/S: R no susp solid mass, L [id number] partially circumscribed/angular solid nodule 7:00 12N  tanner to mmg finding, 4x3mm circumscribed 2:00 nodule 2N unchanged, 5x3mm circumscribed hypoechoic nodule or complex cyst 3:00 6N, rec u/s bx 7:00 12mm nodule, BR4B ADDENDUM: L axillary U/S performed prior to bx 10/7 and no L axillary adenopathy noted.\par 10/7/21, L U/S bx 7:00 10N 10mm nodule PATH: pending

## 2021-10-07 NOTE — PAST MEDICAL HISTORY
[Menarche Age ____] : age at menarche was [unfilled] [Total Preg ___] : G[unfilled] [Age At Live Birth ___] : Age at live birth: [unfilled] [Surgical Menopause] : The patient is in surgical menopause [Menopause Age____] : age at menopause was [unfilled] [History of Hormone Replacement Treatment] : has a history of hormone replacement treatment [Live Births ___] : P[unfilled]  [Living ___] : Living: [unfilled] [de-identified] : h [FreeTextEntry5] : hysterectomy/bilat oopherectomy at 47. [FreeTextEntry7] : Yes [FreeTextEntry8] : Yes

## 2021-10-07 NOTE — PHYSICAL EXAM
[Bra Size: ___] : Bra Size: [unfilled] [Normocephalic] : normocephalic [Atraumatic] : atraumatic [Supple] : supple [No Supraclavicular Adenopathy] : no supraclavicular adenopathy [No Thyromegaly] : no thyromegaly [Examined in the supine and seated position] : examined in the supine and seated position [No dominant masses] : no dominant masses in right breast  [No dominant masses] : no dominant masses left breast [No Nipple Retraction] : no left nipple retraction [No Nipple Discharge] : no left nipple discharge [No Axillary Lymphadenopathy] : no left axillary lymphadenopathy [No Edema] : no edema [No Swelling] : no swelling [Full ROM] : full range of motion [No Rashes] : no rashes [No Ulceration] : no ulceration [de-identified] : OLVIN FAGAN scar [de-identified] : Well healed PA scar, lower inner quadrant bx site, primapore present. tender to palpation

## 2021-10-07 NOTE — ASSESSMENT
[FreeTextEntry1] : Pt is a 76 year old white female referred for consultation by Dr. Hilario Freeman for abnormal mmg/us. Pending L U/S bx results done today 10/7/21. PCP Niels Cancino\par Here w/ her  José Antonio.\par Pt denies any breast lesions, discharge or masses.\par Pt is planning on going to [place] next weds, [place] are [employer] in [place]. \par \par 10/4/21, Korin Biswas sMMG: comp to 2018 studies, het dense, stable R UOQ and mid 3:00 L breast surgical scarring, R bx tissue marker, L SA bx tissue marker, *11x6mm lobulated posterior 12N 7-8:00 L breast nodule, developed, no susp findings, Bilat U/S: R no susp solid mass, L [id number] partially circumscribed/angular solid nodule 7:00 12N  tanner to mmg finding, 4x3mm circumscribed 2:00 nodule 2N unchanged, 5x3mm circumscribed hypoechoic nodule or complex cyst 3:00 6N, *rec u/s bx 7:00 12mm nodule, BR4B ADDENDUM: L axillary U/S performed prior to bx 10/7 and no L axillary adenopathy noted.\par \par 10/7/21, L U/S bx 7:00 10N 10mm nodule PATH: pending\par Hx of MARGARET BSO for fibroids and endometriosis. \par \par Personal hx of L RA benign bx 10/2020, and back in 1992 - FA. \par Fhx: Brother w/ Mesothelioma at 87. AJ\par CBE: 38DD  Bilat well healed PA scars, R GRACIA, L lower inner quadrant bx site w/ primapore present. tender to touch. No adenopathy. \par Reviewed with pt results of recent studies. She just had her biopsy today on the left 7:00 10 N 1.2 cm lesion and the results are pending. Further management pending results of biopsy, if malignant, would consider MRI, genetic testing since AJ.  Even if benign if discordant, would need excisional biopsy.  Pt to consider delaying her trip if found to be malignant. She would get her care here in [place], not [place]. \par F.u after results of biopsy is back.

## 2021-10-07 NOTE — CONSULT LETTER
[Dear  ___] : Dear  [unfilled], [Consult Letter:] : I had the pleasure of evaluating your patient, [unfilled]. [Please see my note below.] : Please see my note below. [Consult Closing:] : Thank you very much for allowing me to participate in the care of this patient.  If you have any questions, please do not hesitate to contact me. [Sincerely,] : Sincerely, [FreeTextEntry3] : Trista Wagner MD

## 2021-10-11 ENCOUNTER — APPOINTMENT (OUTPATIENT)
Dept: MRI IMAGING | Facility: CLINIC | Age: 76
End: 2021-10-11
Payer: MEDICARE

## 2021-10-11 ENCOUNTER — NON-APPOINTMENT (OUTPATIENT)
Age: 76
End: 2021-10-11

## 2021-10-11 PROCEDURE — 77049 MRI BREAST C-+ W/CAD BI: CPT

## 2021-10-11 PROCEDURE — A9585: CPT

## 2021-10-19 ENCOUNTER — NON-APPOINTMENT (OUTPATIENT)
Age: 76
End: 2021-10-19

## 2021-10-20 ENCOUNTER — NON-APPOINTMENT (OUTPATIENT)
Age: 76
End: 2021-10-20

## 2021-10-20 ENCOUNTER — APPOINTMENT (OUTPATIENT)
Dept: MRI IMAGING | Facility: CLINIC | Age: 76
End: 2021-10-20
Payer: MEDICARE

## 2021-10-20 ENCOUNTER — RESULT REVIEW (OUTPATIENT)
Age: 76
End: 2021-10-20

## 2021-10-20 ENCOUNTER — OUTPATIENT (OUTPATIENT)
Dept: OUTPATIENT SERVICES | Facility: HOSPITAL | Age: 76
LOS: 1 days | End: 2021-10-20
Payer: MEDICARE

## 2021-10-20 DIAGNOSIS — C50.312 MALIGNANT NEOPLASM OF LOWER-INNER QUADRANT OF LEFT FEMALE BREAST: ICD-10-CM

## 2021-10-20 PROCEDURE — A9585: CPT

## 2021-10-20 PROCEDURE — 19085 BX BREAST 1ST LESION MR IMAG: CPT | Mod: LT

## 2021-10-20 PROCEDURE — 77065 DX MAMMO INCL CAD UNI: CPT

## 2021-10-20 PROCEDURE — 88305 TISSUE EXAM BY PATHOLOGIST: CPT | Mod: 26

## 2021-10-20 PROCEDURE — 19085 BX BREAST 1ST LESION MR IMAG: CPT

## 2021-10-20 PROCEDURE — 88305 TISSUE EXAM BY PATHOLOGIST: CPT

## 2021-10-20 PROCEDURE — 88360 TUMOR IMMUNOHISTOCHEM/MANUAL: CPT

## 2021-10-20 PROCEDURE — 77065 DX MAMMO INCL CAD UNI: CPT | Mod: 26,LT

## 2021-10-20 PROCEDURE — 88360 TUMOR IMMUNOHISTOCHEM/MANUAL: CPT | Mod: 26

## 2021-10-20 PROCEDURE — A4648: CPT

## 2021-10-22 ENCOUNTER — APPOINTMENT (OUTPATIENT)
Age: 76
End: 2021-10-22
Payer: MEDICARE

## 2021-10-22 VITALS
HEIGHT: 60 IN | SYSTOLIC BLOOD PRESSURE: 118 MMHG | HEART RATE: 77 BPM | TEMPERATURE: 97.8 F | BODY MASS INDEX: 30.96 KG/M2 | WEIGHT: 157.7 LBS | DIASTOLIC BLOOD PRESSURE: 74 MMHG

## 2021-10-22 DIAGNOSIS — Z17.0 MALIGNANT NEOPLASM OF LOWER-INNER QUADRANT OF LEFT FEMALE BREAST: ICD-10-CM

## 2021-10-22 DIAGNOSIS — C50.312 MALIGNANT NEOPLASM OF LOWER-INNER QUADRANT OF LEFT FEMALE BREAST: ICD-10-CM

## 2021-10-22 DIAGNOSIS — Z78.9 OTHER SPECIFIED HEALTH STATUS: ICD-10-CM

## 2021-10-22 PROCEDURE — 93702 BIS XTRACELL FLUID ANALYSIS: CPT

## 2021-10-22 PROCEDURE — 99215 OFFICE O/P EST HI 40 MIN: CPT | Mod: 25

## 2021-10-22 NOTE — PHYSICAL EXAM
[Normocephalic] : normocephalic [Atraumatic] : atraumatic [Supple] : supple [No Supraclavicular Adenopathy] : no supraclavicular adenopathy [No Thyromegaly] : no thyromegaly [Examined in the supine and seated position] : examined in the supine and seated position [Bra Size: ___] : Bra Size: [unfilled] [No dominant masses] : no dominant masses in right breast  [No dominant masses] : no dominant masses left breast [No Nipple Retraction] : no left nipple retraction [No Nipple Discharge] : no left nipple discharge [No Axillary Lymphadenopathy] : no left axillary lymphadenopathy [No Edema] : no edema [No Swelling] : no swelling [Full ROM] : full range of motion [No Rashes] : no rashes [No Ulceration] : no ulceration

## 2021-10-27 ENCOUNTER — NON-APPOINTMENT (OUTPATIENT)
Age: 76
End: 2021-10-27

## 2021-10-27 ENCOUNTER — APPOINTMENT (OUTPATIENT)
Dept: PLASTIC SURGERY | Facility: CLINIC | Age: 76
End: 2021-10-27
Payer: MEDICARE

## 2021-10-27 VITALS
OXYGEN SATURATION: 97 % | HEART RATE: 71 BPM | TEMPERATURE: 98.3 F | DIASTOLIC BLOOD PRESSURE: 84 MMHG | SYSTOLIC BLOOD PRESSURE: 132 MMHG | BODY MASS INDEX: 31.8 KG/M2 | HEIGHT: 60 IN | RESPIRATION RATE: 16 BRPM | WEIGHT: 162 LBS

## 2021-10-27 DIAGNOSIS — Z82.49 FAMILY HISTORY OF ISCHEMIC HEART DISEASE AND OTHER DISEASES OF THE CIRCULATORY SYSTEM: ICD-10-CM

## 2021-10-27 DIAGNOSIS — C50.812 MALIGNANT NEOPLASM OF OVERLAPPING SITES OF LEFT FEMALE BREAST: ICD-10-CM

## 2021-10-27 DIAGNOSIS — Z83.79 FAMILY HISTORY OF OTHER DISEASES OF THE DIGESTIVE SYSTEM: ICD-10-CM

## 2021-10-27 DIAGNOSIS — Z42.1 ENCOUNTER FOR BREAST RECONSTRUCTION FOLLOWING MASTECTOMY: ICD-10-CM

## 2021-10-27 DIAGNOSIS — L72.0 EPIDERMAL CYST: ICD-10-CM

## 2021-10-27 PROCEDURE — G2212 PROLONG OUTPT/OFFICE VIS: CPT

## 2021-10-27 PROCEDURE — 99205 OFFICE O/P NEW HI 60 MIN: CPT

## 2021-10-29 PROBLEM — C50.812 MALIGNANT NEOPLASM OF OVERLAPPING SITES OF LEFT FEMALE BREAST, UNSPECIFIED ESTROGEN RECEPTOR STATUS: Status: ACTIVE | Noted: 2021-10-22

## 2021-10-29 PROBLEM — L72.0 INCLUSION CYST: Status: ACTIVE | Noted: 2021-10-29

## 2021-10-29 PROBLEM — Z42.1 ENCOUNTER FOR BREAST RECONSTRUCTION FOLLOWING MASTECTOMY: Status: ACTIVE | Noted: 2021-10-29

## 2021-10-29 NOTE — REVIEW OF SYSTEMS
[Cough] : cough [Negative] : Gastrointestinal [FreeTextEntry4] : seasonal allergies, left forehead cyst [FreeTextEntry6] : Cough related to seasonal allergies, pulmonary nodules monitored twice a year on CTs [de-identified] : Multiple inclusion cysts, including one on her left chest that became infected in the past.

## 2021-10-29 NOTE — ASSESSMENT
[FreeTextEntry1] : Left mastectomy is being planned, and pt is here for consultation regarding breast reconstruction.\par We discussed the spectrum of reconstructive options, including no reconstruction, implant based reconstruction and autologous reconstruction. We discussed the risk/benefit ratio for each of these options.\par Pt reports that having no reconstruction is not a consideration for her.\par We discussed at length the R/B/A of implant based reconstruction, including the potential for a one- or two-stage reconstruction. We discussed that implants are not considered lifetime devices, and may need to be replaced in the future. We discussed the risks of infection requiring implant removal, rupture, capsular contracture and implant exposure (especially following radiation).\par We discussed at length the R/B/A of abdominally based autologous reconstruction, including, but not limited to complete flap failure, scarring, poor wound healing, bleeding/hematoma, infection, return to OR for attempted salvage. We also discussed the perioperative course, including CTA/MRA for planning, postoperative monitoring and drains and expected length of surgery and hospital stay. She is aware that abdominal weakness is also a possibility. Because of this, she is leaning away from flap surgery as she does not want to have any change in her core strength as playing golf and tennis are important to her.\par Currently, she is leaning toward implant based reconstruction, but would like to have a second consultation to confirm. She is unsure whether she would like to follow up in person or with a telehealth visit.\par I will coordinate with Dr. Wagner as well.

## 2021-10-29 NOTE — SURGICAL HISTORY
[de-identified] : Right total knee by Dr. Lr May 2018 at [facility] [de-identified] : 1992 hysterectomy Dr Abreu [facility] [de-identified] : 1992 fibroadenoma of the breasts Dr Logan

## 2021-10-29 NOTE — HISTORY OF PRESENT ILLNESS
[FreeTextEntry1] : This is a 77 yo woman who presents for evaluation for breast reconstruction after left mastectomy.\par She reports she had a mammo/US last year and a 6 month follow up was planned, but got delayed due to COVID. On , she reports getting another mammo/US and having Dr. Freeman  arrange for a biopsy and visit with Dr. 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

## 2021-10-29 NOTE — PHYSICAL EXAM
[NI] : Normal [Bra Size: _______] : Bra Size: [unfilled] [de-identified] : Left forehead mass, about 1 cm, not mobile. Deep to SQ. Poss in periosteum or bone. [de-identified] : NL respiratory effort noted  [de-identified] : Bilateral breasts with ptosis, grade 2-3.\par The right breast is slightly larger and the NAC is lower by about 2-3 cm.\par No palpable masses. No palpable MERRICK\par Left lateral periareolar scar visible. Right fibroadenoma scar is difficult to discern.\par Base diameter 14-15 cm. [de-identified] : Intra-abdominal adipose.\par SQ adipose and moderate skin excess.\par Low pfannenstiel scars x 2, one within the mons hairline.

## 2021-10-29 NOTE — REASON FOR VISIT
[Initial Evaluation] : an initial evaluation [FreeTextEntry1] : pt is here for a consult for breast reconstruction

## 2021-11-01 ENCOUNTER — NON-APPOINTMENT (OUTPATIENT)
Age: 76
End: 2021-11-01

## 2021-11-01 NOTE — DATA REVIEWED
[FreeTextEntry1] : 10/20/21, L 12:00 MRI bx PATH: IDC, at least 0.5cm, concordant, hormone receptors pending, rec surg/onc mgmt

## 2021-11-01 NOTE — PROCEDURE
[FreeTextEntry1] : SILVERIO [FreeTextEntry2] : lymphedema analysis [FreeTextEntry3] : The patient had a baseline SOZO measurement which I reviewed today. The score is within normal limits, see flowsheet. Bioimpedance spectroscopy helps identify the onset of lymphedema in an arm or leg before patients experience noticeable swelling. Research has shown that the early detection of lymphedema using L-Dex combined with treatment can reduce progression to chronic lymphedema by 95% in breast cancer patients. Whenever possible, patients are tested for baseline L-Dex score before cancer treatment begins and then are reassessed during regular follow-up visits using the SOZO device. Otherwise, this can be started postoperatively and continued during regular follow-up visits. If the patient’s L-Dex score increases above normal levels, that is a sign that lymphedema is developing and a referral is made to physical therapy for further evaluation and early compression treatment. Lymphedema assessment with the SOZO L-Dex score is recommended to be done every 3 months for the first 3 years and then every 6 months for years 4 and 5 followed by annually afterwards.\par

## 2021-11-01 NOTE — PAST MEDICAL HISTORY
[Surgical Menopause] : The patient is in surgical menopause [Menarche Age ____] : age at menarche was [unfilled] [Menopause Age____] : age at menopause was [unfilled] [History of Hormone Replacement Treatment] : has a history of hormone replacement treatment [Total Preg ___] : G[unfilled] [Live Births ___] : P[unfilled]  [Living ___] : Living: [unfilled] [Age At Live Birth ___] : Age at live birth: [unfilled] [FreeTextEntry5] : hysterectomy/bilat oopherectomy at 47. [FreeTextEntry7] : Yes [FreeTextEntry8] : Yes

## 2021-11-01 NOTE — HISTORY OF PRESENT ILLNESS
[FreeTextEntry1] : Pt is a 76 year old white BRCA1/2 negative female here for f/u for L breast Cancer.\par PCP Niels Cancino; referred by Dr. Hilario Freeman \par Here w/ her  José Antonio and daughter Sherly.\par Pt denies any breast lesions, discharge or masses.\par [place] are [employer] in [place], [place]\par \par 10/4/21, Korin Biswas sMMG: comp to 2018 studies, het dense, stable R UOQ and mid 3:00 L breast surgical scarring, R bx tissue marker, L SA bx tissue marker, *11x6mm lobulated posterior 12N 7-8:00 L breast nodule, developed, no susp findings, Bilat U/S: R no susp solid mass, L [id number] partially circumscribed/angular solid nodule 7:00 12N tanner to mmg finding, 4x3mm circumscribed 2:00 nodule 2N unchanged, 5x3mm circumscribed hypoechoic nodule or complex cyst 3:00 6N, *rec u/s bx 7:00 12mm nodule, BR4B ADDENDUM: L axillary U/S performed prior to bx 10/7 and no L axillary adenopathy noted.\par 10/7/21 L U/S bx 7:00 10mm nodule PATH - *invasive mod differentiated mammary carcinoma, at least 8mm, ER/DE + 90% Her2 CISH negative\par 10/11/21, [place], MRI: R scattered enhancing nonspecific foci, no susp enhancement, L inferior medial posteriorly enhancing mass [id number] w/ internal signal void rep bx proven malignancy, more superiorly 12:00 posteriorly enhancing 7mm mass, margins are not completely well-circumscribed, bx of this mass is rec under MRI guidance, no sig axillary or internal mammary lymphadenopathy, BR4B.\par 10/20/21, L 12:00 MRI bx PATH: IDC, at least 0.5cm, concordant, hormone receptors pending, rec surg/onc mgmt\par \par \par Hx of MARGARET BSO for fibroids and endometriosis. \par Personal hx of L RA benign bx 10/2020, and back in 1992 - FA. \par Fhx: Brother w/ Mesothelioma at 87. AJ\par \par  \par

## 2021-11-01 NOTE — ADDENDUM
[FreeTextEntry1] : 10/20/21 Left Breast MRI bx showed IDC Gr1, 5 mm on bx (7 mm on MRI), 90%/90% and [id number] neg.  Stage T1bN0 M0, Stage 1.\par

## 2021-11-03 ENCOUNTER — APPOINTMENT (OUTPATIENT)
Dept: PLASTIC SURGERY | Facility: CLINIC | Age: 76
End: 2021-11-03

## 2021-11-18 ENCOUNTER — RX RENEWAL (OUTPATIENT)
Age: 76
End: 2021-11-18

## 2021-11-22 ENCOUNTER — OUTPATIENT (OUTPATIENT)
Dept: OUTPATIENT SERVICES | Facility: HOSPITAL | Age: 76
LOS: 1 days | End: 2021-11-22

## 2022-05-20 ENCOUNTER — NON-APPOINTMENT (OUTPATIENT)
Age: 77
End: 2022-05-20

## 2022-05-24 ENCOUNTER — APPOINTMENT (OUTPATIENT)
Dept: CT IMAGING | Facility: CLINIC | Age: 77
End: 2022-05-24
Payer: MEDICARE

## 2022-05-24 ENCOUNTER — RX RENEWAL (OUTPATIENT)
Age: 77
End: 2022-05-24

## 2022-05-24 PROCEDURE — G1004: CPT

## 2022-05-24 PROCEDURE — 71250 CT THORAX DX C-: CPT | Mod: ME

## 2022-05-25 ENCOUNTER — NON-APPOINTMENT (OUTPATIENT)
Age: 77
End: 2022-05-25

## 2022-05-25 ENCOUNTER — APPOINTMENT (OUTPATIENT)
Dept: CARDIOLOGY | Facility: CLINIC | Age: 77
End: 2022-05-25
Payer: MEDICARE

## 2022-05-25 VITALS — DIASTOLIC BLOOD PRESSURE: 78 MMHG | SYSTOLIC BLOOD PRESSURE: 154 MMHG

## 2022-05-25 VITALS — DIASTOLIC BLOOD PRESSURE: 72 MMHG | SYSTOLIC BLOOD PRESSURE: 142 MMHG

## 2022-05-25 VITALS
BODY MASS INDEX: 31.25 KG/M2 | OXYGEN SATURATION: 98 % | DIASTOLIC BLOOD PRESSURE: 94 MMHG | WEIGHT: 160 LBS | HEART RATE: 70 BPM | SYSTOLIC BLOOD PRESSURE: 172 MMHG

## 2022-05-25 DIAGNOSIS — I95.1 ORTHOSTATIC HYPOTENSION: ICD-10-CM

## 2022-05-25 DIAGNOSIS — I10 ESSENTIAL (PRIMARY) HYPERTENSION: ICD-10-CM

## 2022-05-25 PROCEDURE — 93000 ELECTROCARDIOGRAM COMPLETE: CPT

## 2022-05-25 PROCEDURE — 99205 OFFICE O/P NEW HI 60 MIN: CPT

## 2022-05-25 RX ORDER — ANASTROZOLE TABLETS 1 MG/1
1 TABLET ORAL DAILY
Refills: 0 | Status: ACTIVE | COMMUNITY
Start: 2022-05-25

## 2022-05-25 RX ORDER — HYDROCHLOROTHIAZIDE 12.5 MG/1
12.5 CAPSULE ORAL
Refills: 0 | Status: DISCONTINUED | COMMUNITY
End: 2022-05-25

## 2022-05-25 RX ORDER — LOSARTAN POTASSIUM 100 MG/1
100 TABLET, FILM COATED ORAL
Qty: 1 | Refills: 3 | Status: ACTIVE | COMMUNITY

## 2022-05-25 NOTE — HISTORY OF PRESENT ILLNESS
[FreeTextEntry1] : 77-year-old woman presents for initial cardiac evaluation related to concerns about family history of coronary artery disease and her personal history of hypertension and orthostatic hypotension.\par \par She has enjoyed excellent general health.  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  \par \par In 2020, she was first found to be hypertensive around the time of her breast biopsy.  She required right mastectomy followed by reconstruction but did not require chemotherapy or radiation therapy.  She is maintained on anastrozole.  Losartan and amlodipine were instituted at that time.  The latter was discontinued because of edema and a diuretic was added but that was subsequent discontinued after she developed episodes of orthostatic dizziness with hypotension as low as 90/50.  She has fainted twice over the past 2 years.  1 episode occurred after playing tennis in the extreme heat and following alcohol consumption at lunch.  Second of these episodes occurred also following alcohol intake on a plane.  Thereafter, multiple episodes of orthostatic lightheadedness occurring almost exclusively after lunch which includes alcohol.  No recurrence of syncope.\par \par She is a very active woman including tennis 4 days weekly, golf, aqua aerobics and [employer] classes without any effort provoked symptoms.  No c/o chest, throat,jaw, arm or upper back discomfort.  No dyspnea, orthopnea or PND.  No palpitations.   No edema or claudication.\par \par Approximately 4 years ago, prior to right knee replacement some concern was raised on the basis of her ECG leading to a normal echocardiogram and normal nuclear stress test at [facility].\par \par There is no history of rheumatic fever or heart murmur.  No thyroid disorder.  She is maintained on atorvastatin for hyperlipidemia which was detected approximately 2 years ago.  Smoked from age 14 to age 34.  Has a glass of wine or a martini nightly.  No recreational drugs.  Diet is unrestricted.  Fish once weekly. Uses oliveoil as a base for salads and cooking.\par \par Family history remarkable for hypertension and premature coronary heart disease.  (Brother)

## 2022-06-13 ENCOUNTER — APPOINTMENT (OUTPATIENT)
Dept: PULMONOLOGY | Facility: CLINIC | Age: 77
End: 2022-06-13
Payer: MEDICARE

## 2022-06-13 VITALS
HEART RATE: 59 BPM | OXYGEN SATURATION: 96 % | BODY MASS INDEX: 30.82 KG/M2 | DIASTOLIC BLOOD PRESSURE: 81 MMHG | SYSTOLIC BLOOD PRESSURE: 132 MMHG | WEIGHT: 157 LBS | TEMPERATURE: 98.2 F | HEIGHT: 60 IN

## 2022-06-13 DIAGNOSIS — R91.8 OTHER NONSPECIFIC ABNORMAL FINDING OF LUNG FIELD: ICD-10-CM

## 2022-06-13 PROCEDURE — 99214 OFFICE O/P EST MOD 30 MIN: CPT | Mod: 25

## 2022-06-13 PROCEDURE — 94010 BREATHING CAPACITY TEST: CPT

## 2022-06-13 PROCEDURE — 95012 NITRIC OXIDE EXP GAS DETER: CPT

## 2022-06-14 NOTE — ASSESSMENT
[FreeTextEntry1] : Repeat CT 1 year.  In light of brother with mesothelioma multiple pulmonary nodules and history of breast carcinoma.\par Observe off of bronchodilator therapy.\par PRN beta agonist\par \par 35 minutes spent in evaluation management and review of studies.\par \par

## 2022-06-14 NOTE — PHYSICAL EXAM
[No Acute Distress] : no acute distress [Normal S1, S2] : normal s1, s2 [No Murmurs] : no murmurs [Benign] : benign [No HSM] : no hsm [No Clubbing] : no clubbing [No Cyanosis] : no cyanosis [No Edema] : no edema [Oriented x3] : oriented x3 [Normal to Percussion] : normal to percussion [TextBox_68] : Rare crackles right base.  No wheezing

## 2022-06-14 NOTE — PROCEDURE
[FreeTextEntry1] : 06/14/2022\par Pulmonary function testing\par Normal flow rates.\par \par \par CT of the chest of May 24, 2022 reviewed and discussed with patient.

## 2022-06-14 NOTE — DISCUSSION/SUMMARY
[FreeTextEntry1] : Breast carcinoma.\par Mild bronchiectasis.\par Multiple pulmonary nodules.\par No definitive evidence of airways reactivity.  Normal flow rates and normal NIOX.

## 2022-07-01 ENCOUNTER — RX RENEWAL (OUTPATIENT)
Age: 77
End: 2022-07-01

## 2022-07-26 ENCOUNTER — RX RENEWAL (OUTPATIENT)
Age: 77
End: 2022-07-26

## 2022-08-26 ENCOUNTER — RX RENEWAL (OUTPATIENT)
Age: 77
End: 2022-08-26

## 2022-08-28 ENCOUNTER — RX RENEWAL (OUTPATIENT)
Age: 77
End: 2022-08-28

## 2022-09-08 ENCOUNTER — APPOINTMENT (OUTPATIENT)
Dept: RADIOLOGY | Facility: CLINIC | Age: 77
End: 2022-09-08

## 2022-09-08 PROCEDURE — 77080 DXA BONE DENSITY AXIAL: CPT

## 2022-09-23 ENCOUNTER — RX RENEWAL (OUTPATIENT)
Age: 77
End: 2022-09-23

## 2022-10-03 ENCOUNTER — APPOINTMENT (OUTPATIENT)
Dept: ULTRASOUND IMAGING | Facility: CLINIC | Age: 77
End: 2022-10-03

## 2022-10-03 ENCOUNTER — APPOINTMENT (OUTPATIENT)
Dept: MAMMOGRAPHY | Facility: CLINIC | Age: 77
End: 2022-10-03

## 2023-01-24 NOTE — HISTORY OF PRESENT ILLNESS
[TextBox_4] : Feeling well. \par Had mastectomy and reconstruction. No RT\par No chemo. On hormonal therapy.\par Brother  of mesothelioma\par Had CT. \par Still some cough worse with allergies. \par Taking Xyzal and Singulair.\par Not using Flovent or Asmanex.  Feels no definitive response difficulty with cost.\par Told is covered for Advair.\par Feels more upper airway in origin\par \par \par \par  Vtama Pregnancy And Lactation Text: It is unknown if this medication can cause problems during pregnancy and breastfeeding.

## 2023-03-03 ENCOUNTER — RX RENEWAL (OUTPATIENT)
Age: 78
End: 2023-03-03

## 2023-05-18 ENCOUNTER — APPOINTMENT (OUTPATIENT)
Dept: CT IMAGING | Facility: CLINIC | Age: 78
End: 2023-05-18
Payer: MEDICARE

## 2023-05-18 PROCEDURE — 71250 CT THORAX DX C-: CPT | Mod: MH

## 2023-06-01 ENCOUNTER — NON-APPOINTMENT (OUTPATIENT)
Age: 78
End: 2023-06-01

## 2023-06-05 ENCOUNTER — APPOINTMENT (OUTPATIENT)
Dept: PULMONOLOGY | Facility: CLINIC | Age: 78
End: 2023-06-05
Payer: MEDICARE

## 2023-06-05 VITALS — HEART RATE: 67 BPM | SYSTOLIC BLOOD PRESSURE: 137 MMHG | DIASTOLIC BLOOD PRESSURE: 79 MMHG | OXYGEN SATURATION: 95 %

## 2023-06-05 DIAGNOSIS — N28.1 CYST OF KIDNEY, ACQUIRED: ICD-10-CM

## 2023-06-05 DIAGNOSIS — C50.919 MALIGNANT NEOPLASM OF UNSPECIFIED SITE OF UNSPECIFIED FEMALE BREAST: ICD-10-CM

## 2023-06-05 LAB — POCT - HEMOGLOBIN (HGB), QUANTITATIVE, TRANSCUTANEOUS: 13.7

## 2023-06-05 PROCEDURE — 88738 HGB QUANT TRANSCUTANEOUS: CPT

## 2023-06-05 PROCEDURE — 99214 OFFICE O/P EST MOD 30 MIN: CPT | Mod: 25

## 2023-06-05 PROCEDURE — 94729 DIFFUSING CAPACITY: CPT

## 2023-06-05 PROCEDURE — 94010 BREATHING CAPACITY TEST: CPT

## 2023-06-05 PROCEDURE — ZZZZZ: CPT

## 2023-06-05 PROCEDURE — 94727 GAS DIL/WSHOT DETER LNG VOL: CPT

## 2023-06-05 NOTE — REVIEW OF SYSTEMS
[Negative] : Endocrine Complex Repair And Tissue Cultured Epidermal Autograft Text: The defect edges were debeveled with a #15 scalpel blade.  The primary defect was closed partially with a complex linear closure.  Given the location of the defect, shape of the defect and the proximity to free margins an tissue cultured epidermal autograft was deemed most appropriate to repair the remaining defect.  The graft was trimmed to fit the size of the remaining defect.  The graft was then placed in the primary defect, oriented appropriately, and sutured into place.

## 2023-06-07 PROBLEM — C50.919 BREAST CANCER: Status: ACTIVE | Noted: 2021-10-27

## 2023-06-07 NOTE — PHYSICAL EXAM
[No Acute Distress] : no acute distress [Normal S1, S2] : normal s1, s2 [No Murmurs] : no murmurs [Normal to Percussion] : normal to percussion [Benign] : benign [No HSM] : no hsm [No Clubbing] : no clubbing [No Cyanosis] : no cyanosis [No Edema] : no edema [Oriented x3] : oriented x3 [TextBox_68] : Rare crackles right base.  No wheezing

## 2023-06-07 NOTE — ASSESSMENT
[FreeTextEntry1] : Repeat CT 1 year.  In light of brother with mesothelioma multiple pulmonary nodules and history of breast carcinoma.  Task sent as reminder\par Observe off of bronchodilator therapy.\par PRN beta agonist\par \par 35 minutes spent in evaluation management and review of studies.\par \par

## 2023-06-07 NOTE — HISTORY OF PRESENT ILLNESS
[TextBox_4] : Feeling well. \par Had recent Ct chest\par did go to a allergist and does have allergies including mold.\par Last year mastectomy and reconstruction. No RT\par No chemo. On hormonal therapy.\par Brother  of mesothelioma\par Had CT. \par Still some cough worse with allergies. No change Feels more upper airway in origin\par off inhalers since last year\par Taking Xyzal and Singulair.\par \par \par \par \par \par \par

## 2023-06-07 NOTE — PROCEDURE
[FreeTextEntry1] : 06/05/2023\par Pulmonary function testing\par FEV1, FVC, and FEV1/FVC are within normal limits. TLC and subdivisions are normal. RV/TLC ratio is normal. Single breath diffusion capacity is normal. \par No significant change in flow rates compared to June 2022.\par Mild decrease in function compared to November 2019.\par \par \par CAT scan of the chest from 18th 2023 reviewed discussed with patient and compared to prior.\par Multiple stable pulmonary nodules.

## 2023-06-13 ENCOUNTER — APPOINTMENT (OUTPATIENT)
Dept: ULTRASOUND IMAGING | Facility: CLINIC | Age: 78
End: 2023-06-13
Payer: MEDICARE

## 2023-06-13 PROCEDURE — 76700 US EXAM ABDOM COMPLETE: CPT

## 2023-08-13 ENCOUNTER — RX RENEWAL (OUTPATIENT)
Age: 78
End: 2023-08-13

## 2023-08-25 ENCOUNTER — APPOINTMENT (OUTPATIENT)
Dept: MRI IMAGING | Facility: CLINIC | Age: 78
End: 2023-08-25

## 2023-08-29 ENCOUNTER — APPOINTMENT (OUTPATIENT)
Dept: MRI IMAGING | Facility: CLINIC | Age: 78
End: 2023-08-29

## 2023-09-11 ENCOUNTER — APPOINTMENT (OUTPATIENT)
Dept: ORTHOPEDIC SURGERY | Facility: CLINIC | Age: 78
End: 2023-09-11

## 2024-03-21 ENCOUNTER — NON-APPOINTMENT (OUTPATIENT)
Age: 79
End: 2024-03-21

## 2024-03-25 ENCOUNTER — RX RENEWAL (OUTPATIENT)
Age: 79
End: 2024-03-25

## 2024-03-25 RX ORDER — MONTELUKAST 10 MG/1
10 TABLET, FILM COATED ORAL
Qty: 30 | Refills: 5 | Status: ACTIVE | COMMUNITY
Start: 2021-05-14 | End: 1900-01-01

## 2024-05-17 ENCOUNTER — APPOINTMENT (OUTPATIENT)
Dept: PULMONOLOGY | Facility: CLINIC | Age: 79
End: 2024-05-17
Payer: MEDICARE

## 2024-05-17 VITALS — DIASTOLIC BLOOD PRESSURE: 65 MMHG | OXYGEN SATURATION: 95 % | HEART RATE: 73 BPM | SYSTOLIC BLOOD PRESSURE: 101 MMHG

## 2024-05-17 DIAGNOSIS — R93.89 ABNORMAL FINDINGS ON DIAGNOSTIC IMAGING OF OTHER SPECIFIED BODY STRUCTURES: ICD-10-CM

## 2024-05-17 DIAGNOSIS — J47.9 BRONCHIECTASIS, UNCOMPLICATED: ICD-10-CM

## 2024-05-17 PROCEDURE — 99213 OFFICE O/P EST LOW 20 MIN: CPT

## 2024-05-17 RX ORDER — ALBUTEROL SULFATE 90 UG/1
108 (90 BASE) AEROSOL, METERED RESPIRATORY (INHALATION)
Qty: 1 | Refills: 5 | Status: DISCONTINUED | COMMUNITY
Start: 2018-10-31 | End: 2024-05-17

## 2024-05-17 RX ORDER — FLUTICASONE PROPIONATE 220 UG/1
220 AEROSOL, METERED RESPIRATORY (INHALATION) TWICE DAILY
Qty: 1 | Refills: 5 | Status: DISCONTINUED | COMMUNITY
Start: 2021-05-26 | End: 2024-05-17

## 2024-05-17 RX ORDER — FLUTICASONE PROPIONATE 50 UG/1
50 SPRAY, METERED NASAL
Qty: 1 | Refills: 5 | Status: ACTIVE | COMMUNITY
Start: 2024-05-17 | End: 1900-01-01

## 2024-05-17 RX ORDER — MOMETASONE FUROATE 200 UG/1
200 AEROSOL RESPIRATORY (INHALATION)
Qty: 1 | Refills: 5 | Status: DISCONTINUED | COMMUNITY
Start: 2019-03-12 | End: 2024-05-17

## 2024-05-17 RX ORDER — MOMETASONE FUROATE 220 UG/1
220 INHALANT RESPIRATORY (INHALATION)
Qty: 1 | Refills: 5 | Status: DISCONTINUED | COMMUNITY
Start: 2019-03-12 | End: 2024-05-17

## 2024-05-17 RX ORDER — LEVOCETIRIZINE DIHYDROCHLORIDE 5 MG/1
5 TABLET ORAL
Qty: 90 | Refills: 3 | Status: ACTIVE | COMMUNITY
Start: 2018-10-11 | End: 1900-01-01

## 2024-05-17 NOTE — REASON FOR VISIT
[Follow-Up] : a follow-up visit [Abnormal CXR/ Chest CT] : an abnormal CXR/ chest CT No [Bronchiectasis] : bronchiectasis

## 2024-05-18 NOTE — DISCUSSION/SUMMARY
[FreeTextEntry1] : Breast carcinoma.\par  Mild bronchiectasis.  Function stable and no significant exacerbations.\par  Multiple pulmonary nodules.\par  No definitive evidence of airways reactivity.

## 2024-05-18 NOTE — PROCEDURE
[FreeTextEntry1] : Pet scan 1/8/24 reviewed  06/05/2023 Pulmonary function testing FEV1, FVC, and FEV1/FVC are within normal limits. TLC and subdivisions are normal. RV/TLC ratio is normal. Single breath diffusion capacity is normal.  No significant change in flow rates compared to June 2022. Mild decrease in function compared to November 2019.   CAT scan of the chest from 18th 2023 reviewed discussed with patient and compared to prior. Multiple stable pulmonary nodules.

## 2024-05-18 NOTE — ASSESSMENT
[FreeTextEntry1] : Repeat CT sept.  Continue to observe off of bronchodilator therapy. PRN beta agonist PFT on RTO  35 minutes spent in evaluation management and review of studies.

## 2024-05-18 NOTE — HISTORY OF PRESENT ILLNESS
[Former] : former [TextBox_4] : Had pet 2024 brought in CD, had it because of weight loss ordered by oncologist  having issues with allergies has had increase cough for past month off all inhalers Taking Xyzal and Singular. did go to a allergist and does have allergies including mold. Prior mastectomy and reconstruction. No RT No chemo. On hormonal therapy. Brother  of mesothelioma            [TextBox_11] : 1 [TextBox_13] : 20 [YearQuit] : 1979

## 2024-07-09 ENCOUNTER — RX RENEWAL (OUTPATIENT)
Age: 79
End: 2024-07-09

## 2024-08-15 ENCOUNTER — NON-APPOINTMENT (OUTPATIENT)
Age: 79
End: 2024-08-15

## 2024-08-15 ENCOUNTER — APPOINTMENT (OUTPATIENT)
Dept: CARDIOLOGY | Facility: CLINIC | Age: 79
End: 2024-08-15
Payer: MEDICARE

## 2024-08-15 VITALS
WEIGHT: 146 LBS | SYSTOLIC BLOOD PRESSURE: 140 MMHG | BODY MASS INDEX: 28.66 KG/M2 | HEART RATE: 70 BPM | OXYGEN SATURATION: 98 % | HEIGHT: 60 IN | DIASTOLIC BLOOD PRESSURE: 74 MMHG

## 2024-08-15 DIAGNOSIS — I10 ESSENTIAL (PRIMARY) HYPERTENSION: ICD-10-CM

## 2024-08-15 DIAGNOSIS — R42 DIZZINESS AND GIDDINESS: ICD-10-CM

## 2024-08-15 DIAGNOSIS — E78.5 HYPERLIPIDEMIA, UNSPECIFIED: ICD-10-CM

## 2024-08-15 DIAGNOSIS — I25.10 ATHEROSCLEROTIC HEART DISEASE OF NATIVE CORONARY ARTERY W/OUT ANGINA PECTORIS: ICD-10-CM

## 2024-08-15 DIAGNOSIS — I95.1 ORTHOSTATIC HYPOTENSION: ICD-10-CM

## 2024-08-15 DIAGNOSIS — R00.2 PALPITATIONS: ICD-10-CM

## 2024-08-15 PROCEDURE — 93000 ELECTROCARDIOGRAM COMPLETE: CPT | Mod: 59

## 2024-08-15 PROCEDURE — 99214 OFFICE O/P EST MOD 30 MIN: CPT

## 2024-08-15 PROCEDURE — 93306 TTE W/DOPPLER COMPLETE: CPT

## 2024-08-15 RX ORDER — CALCIUM CITRATE/VITAMIN D3 315MG-6.25
TABLET ORAL
Refills: 0 | Status: ACTIVE | COMMUNITY

## 2024-08-15 RX ORDER — FAMOTIDINE 20 MG/1
20 TABLET, FILM COATED ORAL TWICE DAILY
Refills: 0 | Status: ACTIVE | COMMUNITY

## 2024-08-15 RX ORDER — LEVOCETIRIZINE DIHYDROCHLORIDE 5 MG/1
5 TABLET ORAL
Refills: 0 | Status: ACTIVE | COMMUNITY

## 2024-08-15 RX ORDER — SERTRALINE HYDROCHLORIDE 100 MG/1
100 TABLET, FILM COATED ORAL DAILY
Refills: 0 | Status: ACTIVE | COMMUNITY

## 2024-08-15 RX ORDER — PANTOPRAZOLE 40 MG/1
40 TABLET, DELAYED RELEASE ORAL
Refills: 0 | Status: ACTIVE | COMMUNITY

## 2024-08-15 RX ORDER — ATORVASTATIN CALCIUM 40 MG/1
40 TABLET, FILM COATED ORAL DAILY
Refills: 0 | Status: ACTIVE | COMMUNITY

## 2024-08-15 NOTE — PHYSICAL EXAM
[Well Developed] : well developed [Well Nourished] : well nourished [Normal Conjunctiva] : normal conjunctiva [Normal Venous Pressure] : normal venous pressure [No Carotid Bruit] : no carotid bruit [Normal S1, S2] : normal S1, S2 [Murmur] : murmur [Clear Lung Fields] : clear lung fields [Soft] : abdomen soft [No Edema] : no edema [Normal Radial B/L] : normal radial B/L [Normal DP B/L] : normal DP B/L [Normal Speech] : normal speech [Alert and Oriented] : alert and oriented

## 2024-08-15 NOTE — ASSESSMENT
[FreeTextEntry1] : Reviewed on August 15, 2024. Hospital data from August 12, 2024 was reviewed.  Which includes EKG which showed normal sinus rhythm.  EKG from August 15, 2024 done in the office was reviewed. Labs from August 12, 2024.  CBC was stable sodium 138 potassium 4.2 creatinine 0.8.  Troponin 11.  X-ray chest no active disease CT of the head and neck no significant obstructive disease CT scan of the brain no significant acute intracranial abnormality.

## 2024-08-15 NOTE — REASON FOR VISIT
[Symptom and Test Evaluation] : symptom and test evaluation [Hyperlipidemia] : hyperlipidemia [Hypertension] : hypertension [FreeTextEntry3] : Dr. NATHALIA Cancino [FreeTextEntry1] : 79-year-old white female is referred for hospital consultation in presence of Complain of intermittent fluttering sensation in the chest not associated directly with dizziness, chest pain, shortness of breath, near syncopal syncopal event. She also has continued intermittent dizziness with usually movement of the neck.  No vertiginous symptoms mild nausea.  Chronic upper airway disease nasal congestion and intermittent sinus congestion as well as headaches.  This has been going on for more than 20 years. She uses antihistamines nasal sprays. She also has significant back pain. Her medical history mainly significant for hypertension on losartan 50 mg twice daily,  dyslipidemia on atorvastatin 40 mg . Coronary and aortic calcific atherosclerosis on a CT scan of the chest done by her pulmonologist over time.  Last cardiac workup 5 years ago at [facility] with echocardiogram and nuclear myocardial perfusion scan.

## 2024-08-15 NOTE — DISCUSSION/SUMMARY
[FreeTextEntry1] : 79-year-old female with above medical history active medical problems as noted below. 1.  Dizziness.  Appears to be more related to nasal congestion and sinus involvement.  Recommended antihistamines, nasal spray, evaluation management with PMD.  CT scan of the brain did not comment on significant sinus problems.  She has no fever and white count is normal. Low probability of cardiac etiology for her dizziness.  There is no orthostasis noted. 2.  Intermittent fluttering sensation.  We will have 5-day event monitor to assess evaluate her fluttering and any relation to cardiac arrhythmias. 3.  Coronary/aortic calcification.  Suggestive of atherosclerotic vascular disease.  Recommended continue with atorvastatin. aim ldld < 70. 4. hypertension goal <140/90. preferably < 130/80 cotninue losartan  5.  Dyslipidemia.  Continue statin therapy.  Aim LDL less than 70. 6.  In presence of her dizziness.  Hypertension and CAD.  Recommended also echocardiogram.  For LV RV function and pericardial space evaluation and valvular evaluation.  Based on that along with event monitor we can discuss further whether she would benefit from ischemic evaluation or not.  Counseling regarding low saturated fat,salt and carbohydrate intake was reviewed. Active lifestyle and regular exercise  along with weight management is advised. I have reviewed above at length. I answered all the questions. Patient verbalized understandings. Thank you very much for allowing me to participate in your patient's care. Please feel free to call me for any questions. Sincerely,  Lisa Canela MD, FACC, [facility] [EKG obtained to assist in diagnosis and management of assessed problem(s)] : EKG obtained to assist in diagnosis and management of assessed problem(s)

## 2024-08-23 ENCOUNTER — APPOINTMENT (OUTPATIENT)
Dept: MRI IMAGING | Facility: CLINIC | Age: 79
End: 2024-08-23

## 2024-08-23 PROCEDURE — 72148 MRI LUMBAR SPINE W/O DYE: CPT | Mod: MH

## 2024-08-29 ENCOUNTER — RX CHANGE (OUTPATIENT)
Age: 79
End: 2024-08-29

## 2024-08-29 ENCOUNTER — APPOINTMENT (OUTPATIENT)
Dept: CARDIOLOGY | Facility: CLINIC | Age: 79
End: 2024-08-29
Payer: MEDICARE

## 2024-08-29 VITALS
HEART RATE: 69 BPM | SYSTOLIC BLOOD PRESSURE: 140 MMHG | OXYGEN SATURATION: 99 % | DIASTOLIC BLOOD PRESSURE: 78 MMHG | BODY MASS INDEX: 28.47 KG/M2 | WEIGHT: 145 LBS | HEIGHT: 60 IN

## 2024-08-29 DIAGNOSIS — I95.1 ORTHOSTATIC HYPOTENSION: ICD-10-CM

## 2024-08-29 DIAGNOSIS — I47.10 SUPRAVENTRICULAR TACHYCARDIA, UNSPECIFIED: ICD-10-CM

## 2024-08-29 DIAGNOSIS — I10 ESSENTIAL (PRIMARY) HYPERTENSION: ICD-10-CM

## 2024-08-29 DIAGNOSIS — I25.10 ATHEROSCLEROTIC HEART DISEASE OF NATIVE CORONARY ARTERY W/OUT ANGINA PECTORIS: ICD-10-CM

## 2024-08-29 DIAGNOSIS — R00.2 PALPITATIONS: ICD-10-CM

## 2024-08-29 DIAGNOSIS — R05.9 COUGH, UNSPECIFIED: ICD-10-CM

## 2024-08-29 PROCEDURE — G2211 COMPLEX E/M VISIT ADD ON: CPT

## 2024-08-29 PROCEDURE — 99214 OFFICE O/P EST MOD 30 MIN: CPT

## 2024-08-29 RX ORDER — BUDESONIDE 180 UG/1
180 AEROSOL, POWDER RESPIRATORY (INHALATION)
Qty: 1 | Refills: 0 | Status: ACTIVE | COMMUNITY
Start: 1900-01-01 | End: 1900-01-01

## 2024-08-29 RX ORDER — MOMETASONE FUROATE 220 UG/1
220 INHALANT RESPIRATORY (INHALATION) TWICE DAILY
Qty: 1 | Refills: 0 | Status: DISCONTINUED | COMMUNITY
Start: 2024-08-29 | End: 2024-08-29

## 2024-08-29 NOTE — REVIEW OF SYSTEMS
[Negative] : Constitutional [Dizziness] : dizziness [FreeTextEntry5] : As per HPI [FreeTextEntry6] : As per HPI

## 2024-08-29 NOTE — DISCUSSION/SUMMARY
[FreeTextEntry1] : 79-year-old female with above medical history active medical problems as noted below. 1.  Dizziness.  Appears to be more related to nasal congestion and sinus involvement.  Recommended antihistamines, nasal spray, evaluation management with PMD.  CT scan of the brain did not comment on significant sinus problems.  She has no fever and white count is normal. Low probability of cardiac etiology for her dizziness.  There is no orthostasis noted. No arrhythmia associated with dizziness on cardiac monitoring. 2.  Intermittent fluttering sensation.  Brief episodes of supraventricular arrhythmias noted.  Reviewed with  and wife.  Symptoms are not quality of life altering.  She has preserved EF.  No syncopal event.  No association with dizziness.  Reviewed medication versus no medications and close of follow-up with them.  They have opted for no medication use.  Understands risk benefits alternatives and options. 3.  Coronary/aortic calcification.  Suggestive of atherosclerotic vascular disease.  Recommended continue with atorvastatin. aim ldld < 70.  Due to significant reflux disease.  Aspirin not added at present without any signs of unstable CAD. 4. hypertension goal <140/90. preferably < 130/80 cotninue losartan  5.  Dyslipidemia.  Continue statin therapy.  Aim LDL less than 70. 6.  Chronic cough.  Worse after respiratory illness.  Steroid inhaler worked in the past.  Given prescription for now.  Recommended to see primary care physician if persistent symptoms.  Gargling after steroid inhaler recommended to avoid oral/esophageal thrush. 7. Echocardiogram reviewed.   Counseling regarding low saturated fat,salt and carbohydrate intake was reviewed. Active lifestyle and regular exercise  along with weight management is advised. I have reviewed above at length. I answered all the questions. Patient verbalized understandings. Thank you very much for allowing me to participate in your patient's care. Please feel free to call me for any questions. Sincerely,  Lisa Canela MD, [facility], DEIS[facility] No indicators present

## 2024-08-29 NOTE — DISCUSSION/SUMMARY
[FreeTextEntry1] : 79-year-old female with above medical history active medical problems as noted below. 1.  Dizziness.  Appears to be more related to nasal congestion and sinus involvement.  Recommended antihistamines, nasal spray, evaluation management with PMD.  CT scan of the brain did not comment on significant sinus problems.  She has no fever and white count is normal. Low probability of cardiac etiology for her dizziness.  There is no orthostasis noted. No arrhythmia associated with dizziness on cardiac monitoring. 2.  Intermittent fluttering sensation.  Brief episodes of supraventricular arrhythmias noted.  Reviewed with  and wife.  Symptoms are not quality of life altering.  She has preserved EF.  No syncopal event.  No association with dizziness.  Reviewed medication versus no medications and close of follow-up with them.  They have opted for no medication use.  Understands risk benefits alternatives and options. 3.  Coronary/aortic calcification.  Suggestive of atherosclerotic vascular disease.  Recommended continue with atorvastatin. aim ldld < 70.  Due to significant reflux disease.  Aspirin not added at present without any signs of unstable CAD. 4. hypertension goal <140/90. preferably < 130/80 cotninue losartan  5.  Dyslipidemia.  Continue statin therapy.  Aim LDL less than 70. 6.  Chronic cough.  Worse after respiratory illness.  Steroid inhaler worked in the past.  Given prescription for now.  Recommended to see primary care physician if persistent symptoms.  Gargling after steroid inhaler recommended to avoid oral/esophageal thrush. 7. Echocardiogram reviewed.   Counseling regarding low saturated fat,salt and carbohydrate intake was reviewed. Active lifestyle and regular exercise  along with weight management is advised. I have reviewed above at length. I answered all the questions. Patient verbalized understandings. Thank you very much for allowing me to participate in your patient's care. Please feel free to call me for any questions. Sincerely,  Lisa Canela MD, [facility], DEIS[facility]

## 2024-08-29 NOTE — REASON FOR VISIT
[Symptom and Test Evaluation] : symptom and test evaluation [Hyperlipidemia] : hyperlipidemia [Hypertension] : hypertension [Family Member] : family member [FreeTextEntry3] : Dr. NATHALIA Cancino [FreeTextEntry1] : 79-year-old is seen in the office to review her cardiovascular test.  Since last seen her main complaint is cough.  More in the morning hours.  Did not get better with benzonatate.  In the past she has used steroid inhalers.  She does have chronic cough.  At present got worse since recent respiratory illness.  She also has significant reflux disease.  She does like her colic drink at nighttime.  She does have occasional dizziness with movement of the neck specifically bending forward and up.  Unchanged from before. Her complaint of palpitations have improved. She has no chest pain.  She plays tennis without any significant problem.   Chronic upper airway disease nasal congestion and intermittent sinus congestion as well as headaches.  This has been going on for more than 20 years. She uses antihistamines nasal sprays. She also has significant back pain. Her medical history mainly significant for hypertension on losartan 50 mg twice daily,  dyslipidemia on atorvastatin 40 mg . Coronary and aortic calcific atherosclerosis on a CT scan of the chest done by her pulmonologist over time.

## 2024-08-29 NOTE — CARDIOLOGY SUMMARY
[de-identified] : 8/15/24 nsr non stt changes [de-identified] : September 23, 2024.  Multiple runs of PSVT majority of them very brief .  Longest 1 around 11 seconds. [de-identified] : August 15, 2024.  EF 60 to 65% trace MR/mild pulmonic regurgitation, trace TR.  Questionable PFO.

## 2024-08-29 NOTE — CARDIOLOGY SUMMARY
[de-identified] : 8/15/24 nsr non stt changes [de-identified] : September 23, 2024.  Multiple runs of PSVT majority of them very brief .  Longest 1 around 11 seconds. [de-identified] : August 15, 2024.  EF 60 to 65% trace MR/mild pulmonic regurgitation, trace TR.  Questionable PFO.

## 2024-08-29 NOTE — PHYSICAL EXAM
[Well Developed] : well developed [Normal Venous Pressure] : normal venous pressure [Normal S1, S2] : normal S1, S2 [Clear Lung Fields] : clear lung fields [No Edema] : no edema [Normal Speech] : normal speech [Alert and Oriented] : alert and oriented

## 2024-08-29 NOTE — ASSESSMENT
[FreeTextEntry1] : Reviewed on August 15, 2024. Hospital data from August 12, 2024 was reviewed.  Which includes EKG which showed normal sinus rhythm.  EKG from August 15, 2024 done in the office was reviewed. Labs from August 12, 2024.  CBC was stable sodium 138 potassium 4.2 creatinine 0.8.  Troponin 11.  X-ray chest no active disease CT of the head and neck no significant obstructive disease CT scan of the brain no significant acute intracranial abnormality.  Reviewed on August 29, 2024. Echocardiogram and event monitor were reviewed.

## 2024-09-23 ENCOUNTER — APPOINTMENT (OUTPATIENT)
Dept: RADIOLOGY | Facility: CLINIC | Age: 79
End: 2024-09-23
Payer: MEDICARE

## 2024-09-23 ENCOUNTER — APPOINTMENT (OUTPATIENT)
Dept: CT IMAGING | Facility: CLINIC | Age: 79
End: 2024-09-23
Payer: MEDICARE

## 2024-09-23 PROCEDURE — 71250 CT THORAX DX C-: CPT | Mod: MH

## 2024-09-23 PROCEDURE — 77080 DXA BONE DENSITY AXIAL: CPT

## 2024-10-03 ENCOUNTER — NON-APPOINTMENT (OUTPATIENT)
Age: 79
End: 2024-10-03

## 2024-10-19 ENCOUNTER — NON-APPOINTMENT (OUTPATIENT)
Age: 79
End: 2024-10-19

## 2025-03-31 ENCOUNTER — RX RENEWAL (OUTPATIENT)
Age: 80
End: 2025-03-31

## 2025-04-24 ENCOUNTER — RX RENEWAL (OUTPATIENT)
Age: 80
End: 2025-04-24

## 2025-06-03 ENCOUNTER — RX RENEWAL (OUTPATIENT)
Age: 80
End: 2025-06-03

## 2025-06-23 ENCOUNTER — APPOINTMENT (OUTPATIENT)
Dept: PULMONOLOGY | Facility: CLINIC | Age: 80
End: 2025-06-23
Payer: MEDICARE

## 2025-06-23 VITALS
HEART RATE: 68 BPM | RESPIRATION RATE: 16 BRPM | OXYGEN SATURATION: 98 % | SYSTOLIC BLOOD PRESSURE: 120 MMHG | DIASTOLIC BLOOD PRESSURE: 70 MMHG

## 2025-06-23 PROCEDURE — 99214 OFFICE O/P EST MOD 30 MIN: CPT | Mod: 25

## 2025-06-23 PROCEDURE — 94060 EVALUATION OF WHEEZING: CPT

## 2025-06-23 RX ORDER — BUDESONIDE AND FORMOTEROL FUMARATE DIHYDRATE 80; 4.5 UG/1; UG/1
80-4.5 AEROSOL RESPIRATORY (INHALATION) TWICE DAILY
Qty: 1 | Refills: 3 | Status: ACTIVE | COMMUNITY
Start: 2025-06-23 | End: 1900-01-01

## 2025-06-26 ENCOUNTER — NON-APPOINTMENT (OUTPATIENT)
Age: 80
End: 2025-06-26

## 2025-07-02 ENCOUNTER — APPOINTMENT (OUTPATIENT)
Dept: MRI IMAGING | Facility: CLINIC | Age: 80
End: 2025-07-02

## 2025-07-09 ENCOUNTER — APPOINTMENT (OUTPATIENT)
Dept: MRI IMAGING | Facility: CLINIC | Age: 80
End: 2025-07-09
Payer: MEDICARE

## 2025-07-09 PROCEDURE — 72148 MRI LUMBAR SPINE W/O DYE: CPT

## 2025-08-21 ENCOUNTER — APPOINTMENT (OUTPATIENT)
Dept: CARDIOLOGY | Facility: CLINIC | Age: 80
End: 2025-08-21

## 2025-09-17 ENCOUNTER — APPOINTMENT (OUTPATIENT)
Dept: ULTRASOUND IMAGING | Facility: CLINIC | Age: 80
End: 2025-09-17
Payer: MEDICARE

## 2025-09-17 ENCOUNTER — APPOINTMENT (OUTPATIENT)
Dept: MAMMOGRAPHY | Facility: CLINIC | Age: 80
End: 2025-09-17
Payer: MEDICARE

## 2025-09-17 PROCEDURE — G0279: CPT | Mod: RT

## 2025-09-17 PROCEDURE — 77065 DX MAMMO INCL CAD UNI: CPT | Mod: RT

## 2025-09-17 PROCEDURE — 76641 ULTRASOUND BREAST COMPLETE: CPT | Mod: RT,GA
